# Patient Record
Sex: MALE | Race: ASIAN | Employment: FULL TIME | URBAN - METROPOLITAN AREA
[De-identification: names, ages, dates, MRNs, and addresses within clinical notes are randomized per-mention and may not be internally consistent; named-entity substitution may affect disease eponyms.]

---

## 2018-08-29 ENCOUNTER — ANESTHESIA EVENT (OUTPATIENT)
Dept: ENDOSCOPY | Age: 48
DRG: 378 | End: 2018-08-29
Payer: COMMERCIAL

## 2018-08-29 ENCOUNTER — HOSPITAL ENCOUNTER (INPATIENT)
Age: 48
LOS: 2 days | Discharge: HOME | DRG: 378 | End: 2018-08-31
Attending: EMERGENCY MEDICINE | Admitting: HOSPITALIST
Payer: COMMERCIAL

## 2018-08-29 ENCOUNTER — ANESTHESIA (OUTPATIENT)
Dept: ENDOSCOPY | Age: 48
DRG: 378 | End: 2018-08-29
Payer: COMMERCIAL

## 2018-08-29 ENCOUNTER — APPOINTMENT (OUTPATIENT)
Dept: GENERAL RADIOLOGY | Age: 48
DRG: 378 | End: 2018-08-29
Attending: EMERGENCY MEDICINE
Payer: COMMERCIAL

## 2018-08-29 DIAGNOSIS — K92.2 UPPER GI BLEED: ICD-10-CM

## 2018-08-29 DIAGNOSIS — D64.9 ANEMIA, UNSPECIFIED TYPE: Primary | ICD-10-CM

## 2018-08-29 LAB
ALBUMIN SERPL-MCNC: 3.6 G/DL (ref 3.5–5)
ALBUMIN/GLOB SERPL: 1.3 {RATIO} (ref 1.1–2.2)
ALP SERPL-CCNC: 54 U/L (ref 45–117)
ALT SERPL-CCNC: 43 U/L (ref 12–78)
ANION GAP SERPL CALC-SCNC: 6 MMOL/L (ref 5–15)
APTT PPP: 25.1 SEC (ref 22.1–32)
AST SERPL-CCNC: 30 U/L (ref 15–37)
BASOPHILS # BLD: 0 K/UL (ref 0–0.1)
BASOPHILS NFR BLD: 1 % (ref 0–1)
BILIRUB SERPL-MCNC: 0.3 MG/DL (ref 0.2–1)
BNP SERPL-MCNC: 8 PG/ML (ref 0–125)
BUN SERPL-MCNC: 22 MG/DL (ref 6–20)
BUN/CREAT SERPL: 21 (ref 12–20)
CALCIUM SERPL-MCNC: 7.5 MG/DL (ref 8.5–10.1)
CHLORIDE SERPL-SCNC: 105 MMOL/L (ref 97–108)
CO2 SERPL-SCNC: 28 MMOL/L (ref 21–32)
COMMENT, HOLDF: NORMAL
CREAT SERPL-MCNC: 1.05 MG/DL (ref 0.7–1.3)
DIFFERENTIAL METHOD BLD: ABNORMAL
EOSINOPHIL # BLD: 0 K/UL (ref 0–0.4)
EOSINOPHIL NFR BLD: 1 % (ref 0–7)
ERYTHROCYTE [DISTWIDTH] IN BLOOD BY AUTOMATED COUNT: 12.5 % (ref 11.5–14.5)
GLOBULIN SER CALC-MCNC: 2.7 G/DL (ref 2–4)
GLUCOSE SERPL-MCNC: 111 MG/DL (ref 65–100)
HCT VFR BLD AUTO: 21.8 % (ref 36.6–50.3)
HEMOCCULT STL QL: POSITIVE
HGB BLD-MCNC: 7.3 G/DL (ref 12.1–17)
IMM GRANULOCYTES # BLD: 0 K/UL (ref 0–0.04)
IMM GRANULOCYTES NFR BLD AUTO: 0 % (ref 0–0.5)
INR PPP: 1 (ref 0.9–1.1)
LYMPHOCYTES # BLD: 1.6 K/UL (ref 0.8–3.5)
LYMPHOCYTES NFR BLD: 30 % (ref 12–49)
MCH RBC QN AUTO: 30.2 PG (ref 26–34)
MCHC RBC AUTO-ENTMCNC: 33.5 G/DL (ref 30–36.5)
MCV RBC AUTO: 90.1 FL (ref 80–99)
MONOCYTES # BLD: 0.4 K/UL (ref 0–1)
MONOCYTES NFR BLD: 8 % (ref 5–13)
NEUTS SEG # BLD: 3.2 K/UL (ref 1.8–8)
NEUTS SEG NFR BLD: 61 % (ref 32–75)
NRBC # BLD: 0 K/UL (ref 0–0.01)
NRBC BLD-RTO: 0 PER 100 WBC
PLATELET # BLD AUTO: 283 K/UL (ref 150–400)
PMV BLD AUTO: 9.1 FL (ref 8.9–12.9)
POTASSIUM SERPL-SCNC: 3.6 MMOL/L (ref 3.5–5.1)
PROT SERPL-MCNC: 6.3 G/DL (ref 6.4–8.2)
PROTHROMBIN TIME: 10.5 SEC (ref 9–11.1)
RBC # BLD AUTO: 2.42 M/UL (ref 4.1–5.7)
SAMPLES BEING HELD,HOLD: NORMAL
SODIUM SERPL-SCNC: 139 MMOL/L (ref 136–145)
THERAPEUTIC RANGE,PTTT: NORMAL SECS (ref 58–77)
TROPONIN I SERPL-MCNC: <0.05 NG/ML
WBC # BLD AUTO: 5.3 K/UL (ref 4.1–11.1)

## 2018-08-29 PROCEDURE — 76060000031 HC ANESTHESIA FIRST 0.5 HR: Performed by: INTERNAL MEDICINE

## 2018-08-29 PROCEDURE — 30233N1 TRANSFUSION OF NONAUTOLOGOUS RED BLOOD CELLS INTO PERIPHERAL VEIN, PERCUTANEOUS APPROACH: ICD-10-PCS | Performed by: HOSPITALIST

## 2018-08-29 PROCEDURE — 93005 ELECTROCARDIOGRAM TRACING: CPT

## 2018-08-29 PROCEDURE — 76040000019: Performed by: INTERNAL MEDICINE

## 2018-08-29 PROCEDURE — 36415 COLL VENOUS BLD VENIPUNCTURE: CPT | Performed by: HOSPITALIST

## 2018-08-29 PROCEDURE — 77030010936 HC CLP LIG BSC -C: Performed by: INTERNAL MEDICINE

## 2018-08-29 PROCEDURE — C9113 INJ PANTOPRAZOLE SODIUM, VIA: HCPCS | Performed by: HOSPITALIST

## 2018-08-29 PROCEDURE — 86923 COMPATIBILITY TEST ELECTRIC: CPT | Performed by: EMERGENCY MEDICINE

## 2018-08-29 PROCEDURE — 36430 TRANSFUSION BLD/BLD COMPNT: CPT

## 2018-08-29 PROCEDURE — 74011250636 HC RX REV CODE- 250/636: Performed by: EMERGENCY MEDICINE

## 2018-08-29 PROCEDURE — 74011250636 HC RX REV CODE- 250/636: Performed by: HOSPITALIST

## 2018-08-29 PROCEDURE — 74011250637 HC RX REV CODE- 250/637: Performed by: INTERNAL MEDICINE

## 2018-08-29 PROCEDURE — 85025 COMPLETE CBC W/AUTO DIFF WBC: CPT | Performed by: EMERGENCY MEDICINE

## 2018-08-29 PROCEDURE — 65660000000 HC RM CCU STEPDOWN

## 2018-08-29 PROCEDURE — 99284 EMERGENCY DEPT VISIT MOD MDM: CPT

## 2018-08-29 PROCEDURE — 74011250636 HC RX REV CODE- 250/636

## 2018-08-29 PROCEDURE — P9016 RBC LEUKOCYTES REDUCED: HCPCS | Performed by: EMERGENCY MEDICINE

## 2018-08-29 PROCEDURE — 0W3P8ZZ CONTROL BLEEDING IN GASTROINTESTINAL TRACT, VIA NATURAL OR ARTIFICIAL OPENING ENDOSCOPIC: ICD-10-PCS | Performed by: INTERNAL MEDICINE

## 2018-08-29 PROCEDURE — 82272 OCCULT BLD FECES 1-3 TESTS: CPT | Performed by: EMERGENCY MEDICINE

## 2018-08-29 PROCEDURE — 86900 BLOOD TYPING SEROLOGIC ABO: CPT | Performed by: EMERGENCY MEDICINE

## 2018-08-29 PROCEDURE — 71045 X-RAY EXAM CHEST 1 VIEW: CPT

## 2018-08-29 PROCEDURE — 74011000250 HC RX REV CODE- 250: Performed by: EMERGENCY MEDICINE

## 2018-08-29 PROCEDURE — 85730 THROMBOPLASTIN TIME PARTIAL: CPT | Performed by: EMERGENCY MEDICINE

## 2018-08-29 PROCEDURE — 85610 PROTHROMBIN TIME: CPT | Performed by: EMERGENCY MEDICINE

## 2018-08-29 PROCEDURE — 80053 COMPREHEN METABOLIC PANEL: CPT | Performed by: EMERGENCY MEDICINE

## 2018-08-29 PROCEDURE — 74011250637 HC RX REV CODE- 250/637: Performed by: HOSPITALIST

## 2018-08-29 PROCEDURE — C9113 INJ PANTOPRAZOLE SODIUM, VIA: HCPCS | Performed by: EMERGENCY MEDICINE

## 2018-08-29 PROCEDURE — 84484 ASSAY OF TROPONIN QUANT: CPT | Performed by: EMERGENCY MEDICINE

## 2018-08-29 PROCEDURE — 74011000250 HC RX REV CODE- 250: Performed by: HOSPITALIST

## 2018-08-29 PROCEDURE — 83880 ASSAY OF NATRIURETIC PEPTIDE: CPT | Performed by: EMERGENCY MEDICINE

## 2018-08-29 PROCEDURE — 85018 HEMOGLOBIN: CPT | Performed by: HOSPITALIST

## 2018-08-29 DEVICE — WORKING LENGTH 235CM, WORKING CHANNEL 2.8MM
Type: IMPLANTABLE DEVICE | Site: STOMACH | Status: FUNCTIONAL
Brand: RESOLUTION 360 CLIP

## 2018-08-29 RX ORDER — FENTANYL CITRATE 50 UG/ML
100 INJECTION, SOLUTION INTRAMUSCULAR; INTRAVENOUS
Status: DISCONTINUED | OUTPATIENT
Start: 2018-08-29 | End: 2018-08-29 | Stop reason: HOSPADM

## 2018-08-29 RX ORDER — CLOPIDOGREL BISULFATE 75 MG/1
75 TABLET ORAL DAILY
COMMUNITY

## 2018-08-29 RX ORDER — MIDAZOLAM HYDROCHLORIDE 1 MG/ML
.25-1 INJECTION, SOLUTION INTRAMUSCULAR; INTRAVENOUS
Status: DISCONTINUED | OUTPATIENT
Start: 2018-08-29 | End: 2018-08-29 | Stop reason: HOSPADM

## 2018-08-29 RX ORDER — NALOXONE HYDROCHLORIDE 0.4 MG/ML
0.4 INJECTION, SOLUTION INTRAMUSCULAR; INTRAVENOUS; SUBCUTANEOUS
Status: DISCONTINUED | OUTPATIENT
Start: 2018-08-29 | End: 2018-08-29 | Stop reason: HOSPADM

## 2018-08-29 RX ORDER — ATORVASTATIN CALCIUM 40 MG/1
40 TABLET, FILM COATED ORAL EVERY EVENING
COMMUNITY

## 2018-08-29 RX ORDER — ACETAMINOPHEN 325 MG/1
650 TABLET ORAL
Status: DISCONTINUED | OUTPATIENT
Start: 2018-08-29 | End: 2018-08-31 | Stop reason: HOSPADM

## 2018-08-29 RX ORDER — LOSARTAN POTASSIUM 25 MG/1
12.5 TABLET ORAL
COMMUNITY

## 2018-08-29 RX ORDER — SODIUM CHLORIDE 0.9 % (FLUSH) 0.9 %
5-10 SYRINGE (ML) INJECTION EVERY 8 HOURS
Status: DISCONTINUED | OUTPATIENT
Start: 2018-08-29 | End: 2018-08-31 | Stop reason: HOSPADM

## 2018-08-29 RX ORDER — ATORVASTATIN CALCIUM 10 MG/1
10 TABLET, FILM COATED ORAL EVERY EVENING
COMMUNITY
End: 2018-08-29

## 2018-08-29 RX ORDER — DEXTROMETHORPHAN/PSEUDOEPHED 2.5-7.5/.8
1.2 DROPS ORAL
Status: DISCONTINUED | OUTPATIENT
Start: 2018-08-29 | End: 2018-08-29 | Stop reason: HOSPADM

## 2018-08-29 RX ORDER — SODIUM CHLORIDE 9 MG/ML
50 INJECTION, SOLUTION INTRAVENOUS CONTINUOUS
Status: DISPENSED | OUTPATIENT
Start: 2018-08-29 | End: 2018-08-29

## 2018-08-29 RX ORDER — SODIUM CHLORIDE 0.9 % (FLUSH) 0.9 %
5-10 SYRINGE (ML) INJECTION EVERY 8 HOURS
Status: COMPLETED | OUTPATIENT
Start: 2018-08-29 | End: 2018-08-29

## 2018-08-29 RX ORDER — FLUMAZENIL 0.1 MG/ML
0.2 INJECTION INTRAVENOUS
Status: DISCONTINUED | OUTPATIENT
Start: 2018-08-29 | End: 2018-08-29 | Stop reason: HOSPADM

## 2018-08-29 RX ORDER — EPINEPHRINE 0.1 MG/ML
1 INJECTION INTRACARDIAC; INTRAVENOUS
Status: DISCONTINUED | OUTPATIENT
Start: 2018-08-29 | End: 2018-08-29 | Stop reason: HOSPADM

## 2018-08-29 RX ORDER — PROPOFOL 10 MG/ML
INJECTION, EMULSION INTRAVENOUS AS NEEDED
Status: DISCONTINUED | OUTPATIENT
Start: 2018-08-29 | End: 2018-08-29 | Stop reason: HOSPADM

## 2018-08-29 RX ORDER — ATROPINE SULFATE 0.1 MG/ML
0.5 INJECTION INTRAVENOUS
Status: DISCONTINUED | OUTPATIENT
Start: 2018-08-29 | End: 2018-08-29 | Stop reason: HOSPADM

## 2018-08-29 RX ORDER — ONDANSETRON 2 MG/ML
4 INJECTION INTRAMUSCULAR; INTRAVENOUS
Status: DISCONTINUED | OUTPATIENT
Start: 2018-08-29 | End: 2018-08-31 | Stop reason: HOSPADM

## 2018-08-29 RX ORDER — SODIUM CHLORIDE 9 MG/ML
250 INJECTION, SOLUTION INTRAVENOUS AS NEEDED
Status: DISCONTINUED | OUTPATIENT
Start: 2018-08-29 | End: 2018-08-31 | Stop reason: HOSPADM

## 2018-08-29 RX ORDER — ASPIRIN 81 MG/1
81 TABLET ORAL DAILY
COMMUNITY
End: 2019-05-01

## 2018-08-29 RX ORDER — SODIUM CHLORIDE 0.9 % (FLUSH) 0.9 %
5-10 SYRINGE (ML) INJECTION AS NEEDED
Status: DISCONTINUED | OUTPATIENT
Start: 2018-08-29 | End: 2018-08-31 | Stop reason: HOSPADM

## 2018-08-29 RX ORDER — SODIUM CHLORIDE 9 MG/ML
INJECTION, SOLUTION INTRAVENOUS
Status: DISCONTINUED | OUTPATIENT
Start: 2018-08-29 | End: 2018-08-29 | Stop reason: HOSPADM

## 2018-08-29 RX ORDER — SODIUM CHLORIDE 0.9 % (FLUSH) 0.9 %
5-10 SYRINGE (ML) INJECTION AS NEEDED
Status: ACTIVE | OUTPATIENT
Start: 2018-08-29 | End: 2018-08-29

## 2018-08-29 RX ORDER — ATORVASTATIN CALCIUM 40 MG/1
40 TABLET, FILM COATED ORAL EVERY EVENING
Status: DISCONTINUED | OUTPATIENT
Start: 2018-08-29 | End: 2018-08-31 | Stop reason: HOSPADM

## 2018-08-29 RX ORDER — SODIUM CHLORIDE 9 MG/ML
75 INJECTION, SOLUTION INTRAVENOUS CONTINUOUS
Status: DISCONTINUED | OUTPATIENT
Start: 2018-08-29 | End: 2018-08-30

## 2018-08-29 RX ADMIN — SODIUM CHLORIDE 75 ML/HR: 900 INJECTION, SOLUTION INTRAVENOUS at 21:51

## 2018-08-29 RX ADMIN — SODIUM CHLORIDE: 9 INJECTION, SOLUTION INTRAVENOUS at 15:15

## 2018-08-29 RX ADMIN — SIMETHICONE 80 MG: 20 SUSPENSION/ DROPS ORAL at 15:50

## 2018-08-29 RX ADMIN — Medication 10 ML: at 23:05

## 2018-08-29 RX ADMIN — Medication 10 ML: at 17:46

## 2018-08-29 RX ADMIN — PROPOFOL 50 MG: 10 INJECTION, EMULSION INTRAVENOUS at 15:49

## 2018-08-29 RX ADMIN — PROPOFOL 50 MG: 10 INJECTION, EMULSION INTRAVENOUS at 15:53

## 2018-08-29 RX ADMIN — PROPOFOL 100 MG: 10 INJECTION, EMULSION INTRAVENOUS at 15:46

## 2018-08-29 RX ADMIN — SODIUM CHLORIDE 40 MG: 9 INJECTION, SOLUTION INTRAMUSCULAR; INTRAVENOUS; SUBCUTANEOUS at 23:05

## 2018-08-29 RX ADMIN — PROPOFOL 50 MG: 10 INJECTION, EMULSION INTRAVENOUS at 15:51

## 2018-08-29 RX ADMIN — Medication 10 ML: at 13:45

## 2018-08-29 RX ADMIN — SODIUM CHLORIDE 1000 ML: 900 INJECTION, SOLUTION INTRAVENOUS at 10:28

## 2018-08-29 RX ADMIN — SODIUM CHLORIDE 80 MG: 9 INJECTION, SOLUTION INTRAMUSCULAR; INTRAVENOUS; SUBCUTANEOUS at 10:28

## 2018-08-29 RX ADMIN — SODIUM CHLORIDE 250 ML: 900 INJECTION, SOLUTION INTRAVENOUS at 17:46

## 2018-08-29 RX ADMIN — ATORVASTATIN CALCIUM 40 MG: 40 TABLET, FILM COATED ORAL at 18:40

## 2018-08-29 RX ADMIN — PROPOFOL 50 MG: 10 INJECTION, EMULSION INTRAVENOUS at 15:47

## 2018-08-29 RX ADMIN — Medication 10 ML: at 21:51

## 2018-08-29 NOTE — ED NOTES
Pt resting quietly, no changes to assessment, denies pain. Using cell phone. IV site intact and patent with IVF infusing.

## 2018-08-29 NOTE — PROGRESS NOTES
118 [facility]. 
217 [facility] [address] [place], [address] 
[phone] History and Physical  
 
NAME: Emily Gibson :  1970 MRN:  [id number] HPI:  The patient was seen and examined. Past Surgical History:  
Procedure Laterality Date  [facility] Past Medical History:  
Diagnosis Date  CAD (coronary artery disease)  Gastrointestinal disorder \"ulcer\"  H/O small bowel obstruction  Pyloric stenosis Social History Substance Use Topics  Smoking status: Never Smoker  Smokeless tobacco: Never Used  Alcohol use No  
   Comment: social  
 
Allergies Allergen Reactions  Morphine Rash Family History Problem Relation Age of Onset  Cancer Mother   
  [facility] cancer survivor  Diabetes Father Current Facility-Administered Medications Medication Dose Route Frequency  0.9% sodium chloride infusion 250 mL  250 mL IntraVENous PRN  
 atorvastatin (LIPITOR) tablet 40 mg  40 mg Oral QPM  
 sodium chloride (NS) flush 5-10 mL  5-10 mL IntraVENous Q8H  
 sodium chloride (NS) flush 5-10 mL  5-10 mL IntraVENous PRN  
 acetaminophen (TYLENOL) tablet 650 mg  650 mg Oral Q4H PRN  
 ondansetron (ZOFRAN) injection 4 mg  4 mg IntraVENous Q4H PRN  pantoprazole (PROTONIX) 40 mg in sodium chloride 0.9% 10 mL injection  40 mg IntraVENous Q12H  
 0.9% sodium chloride infusion  75 mL/hr IntraVENous CONTINUOUS Facility-Administered Medications Ordered in Other Encounters Medication Dose Route Frequency  0.9% sodium chloride infusion   IntraVENous CONTINUOUS  
 
 
 
PHYSICAL EXAM: 
General: WD, WN. Alert, cooperative, no acute distress   
HEENT: NC, Atraumatic. PERRLA, EOMI. Anicteric sclerae. Lungs:  CTA Bilaterally. No Wheezing/Rhonchi/Rales. Heart:  Regular  rhythm,  No murmur, No Rubs, No Gallops Abdomen: Soft, Non distended, Non tender.  +Bowel sounds, no HSM Extremities: No c/c/e Neurologic:  CN 2-12 gi, Alert and oriented X 3. No acute neurological distress Psych:   Good insight. Not anxious nor agitated. The heart, lungs and mental status were satisfactory for the administration of MAC sedation and for the procedure. Mallampati score: 2 Assessment: · Anemia/melena Plan:  
· Endoscopic procedure : EGD · MAC sedation

## 2018-08-29 NOTE — PROGRESS NOTES
Alpa Krueger 
1970 
[id number] Situation: 
Verbal report received from: [facility] AT [facility] Rn 
Procedure: Procedure(s): ESOPHAGOGASTRODUODENOSCOPY (EGD) RESOLUTION CLIP Background: 
 
Preoperative diagnosis: GI Bleed Postoperative diagnosis: 1.- Irregular Z- Line 2.- Anastamotic Ulcer with Oozing :  Dr. Bam Cunningham Assistant(s): Endoscopy Technician-1: Yarely Da Silva Endoscopy RN-1: Dalila Covington RN Specimens: * No specimens in log * H. Pylori  no Assessment: 
Intra-procedure medications Anesthesia gave intra-procedure sedation and medications, see anesthesia flow sheet yes Intravenous fluids: NS@ Lambert Caper Vital signs stable  yes Abdominal assessment: round and soft  yes Recommendation: 
Discharge patient per MD order yes. Return to floor yes Family in waiting room Permission to share finding with family or friend yes

## 2018-08-29 NOTE — PROGRESS NOTES
TRANSFER - OUT REPORT: 
 
Verbal report given to daisy Cassidy(name) on Patience Florenceg  being transferred to [facility](unit) for routine post - op Report consisted of patients Situation, Background, Assessment and  
Recommendations(SBAR). Information from the following report(s) SBAR, Accordion and Cardiac Rhythm SR was reviewed with the receiving nurse. Lines:  
Peripheral IV 08/29/18 Right Antecubital (Active) Site Assessment Clean, dry, & intact 8/29/2018  2:52 PM  
Phlebitis Assessment 0 8/29/2018  2:52 PM  
Infiltration Assessment 0 8/29/2018  2:52 PM  
Dressing Status Clean, dry, & intact 8/29/2018  2:52 PM  
Dressing Type Transparent 8/29/2018  2:52 PM  
Hub Color/Line Status Infusing 8/29/2018  2:52 PM  
Action Taken Blood drawn 8/29/2018  9:36 AM  
   
Peripheral IV 08/29/18 Left Arm (Active) Site Assessment Clean, dry, & intact 8/29/2018  3:35 PM  
Phlebitis Assessment 0 8/29/2018  3:35 PM  
Infiltration Assessment 0 8/29/2018  3:35 PM  
Dressing Status Clean, dry, & intact 8/29/2018  3:35 PM  
Dressing Type Tape;Transparent 8/29/2018  3:35 PM  
Hub Color/Line Status Blue 8/29/2018  3:35 PM  
  
 
Opportunity for questions and clarification was provided. Patient transported by transporter.

## 2018-08-29 NOTE — PROGRESS NOTES
Consult called on pt with recent drug eluting stent at [facility] 7/2018 and admit with GI bleed. Primary team contacted cardiology at [facility] who recommended ASA and plavix for 6 months and I agree. Discussed pt with Dr. Vinod Sullivan and reviewed chart. Full consult to follow tomorrow AM.

## 2018-08-29 NOTE — PROGRESS NOTES

## 2018-08-29 NOTE — PROGRESS NOTES
Alix Severin 
1970 
[id number] Situation: 
Verbal report received from: [employer] Preoperative diagnosis: GI Bleed Patient stated . Background: 
Procedure: Procedure(s): ESOPHAGOGASTRODUODENOSCOPY (EGD) Physician performing procedure; Dr. Svetlana Oshea Patient diabetic no Patient taking blood thinners yes Patient has a defibrillator: no  
Patient needs antibiotics: no  
 
Assessment: 
Vital signs stable yes Alert and oriented yes Abdominal assessment: round and soft Recommendation: 
Endoscopic Procedure Family or Friend yes Permission to share finding with Family or Friend yes

## 2018-08-29 NOTE — IP AVS SNAPSHOT
[address] [facility] [place] [address] 
[phone] Patient: Luz Poe MRN: [id number] OJR:[id number] About your hospitalization You were admitted on:  August 29, 2018 You last received care in the:  [place] [id number] You were discharged on:  August 31, 2018 Why you were hospitalized Your primary diagnosis was:  Gi Bleed Follow-up Information Follow up With Details Comments Contact Info Marixa Rojo NP Schedule an appointment as soon as possible for a visit on 9/6/2018 [facility] PCP f/u appointment on Thursday 9/6 @ 3:00 p.m. [address] [facility] [place] [facility] 
[phone] Jose Browning MD Schedule an appointment as soon as possible for a visit in 12 weeks for repeat endoscopy [address] [facility] [address] [facility] [place] [facility] 
[phone] Tata Machado MD Schedule an appointment as soon as possible for a visit  819 [facility] 57 
[phone] Discharge Orders None A check toshia indicates which time of day the medication should be taken. My Medications START taking these medications Instructions Each Dose to Equal  
 Morning Noon Evening Bedtime  
 ferrous sulfate 325 mg (65 mg iron) tablet Your last dose was: Your next dose is: Take 1 Tab by mouth two (2) times daily (with meals). 325 mg  
    
   
   
   
  
 pantoprazole 40 mg tablet Commonly known as:  PROTONIX Your last dose was: Your next dose is: Take 1 Tab by mouth two (2) times a day for 30 days. 40 mg CONTINUE taking these medications Instructions Each Dose to Equal  
 Morning Noon Evening Bedtime  
 aspirin delayed-release 81 mg tablet Your last dose was: Your next dose is: Take 81 mg by mouth daily.   
 81 mg  
    
   
   
   
  
 atorvastatin 40 mg tablet Commonly known as:  LIPITOR Your last dose was: Your next dose is: Take 40 mg by mouth every evening. 40 mg  
    
   
   
   
  
 clopidogrel 75 mg Tab Commonly known as:  PLAVIX Your last dose was: Your next dose is: Take 75 mg by mouth daily. 75 mg FISH OIL PO Your last dose was: Your next dose is: Take 1 Cap by mouth daily. 1 Cap  
    
   
   
   
  
 losartan 25 mg tablet Commonly known as:  COZAAR Your last dose was: Your next dose is: Take 12.5 mg by mouth daily. 12.5 mg  
    
   
   
   
  
 VITAMIN C PO Your last dose was: Your next dose is: Take  by mouth. VITAMIN D3 PO Your last dose was: Your next dose is: Take  by mouth. Where to Get Your Medications Information on where to get these meds will be given to you by the nurse or doctor. ! Ask your nurse or doctor about these medications  
  ferrous sulfate 325 mg (65 mg iron) tablet  
 pantoprazole 40 mg tablet Discharge Instructions Discharge Instructions PATIENT ID: Alpa Krueger MRN: [id number] YOB: 1970 DATE OF ADMISSION: 8/29/2018  9:15 AM   
DATE OF DISCHARGE: 8/31/2018 PRIMARY CARE PROVIDER: Barry Ervin MD  
 
ATTENDING PHYSICIAN: Brent Hui MD 
DISCHARGING PROVIDER: Brent Hui MD   
To contact this individual call [phone] and ask the  to page. If unavailable ask to be transferred the Adult Hospitalist Department. DISCHARGE DIAGNOSES Gastrointestinal bleed, stomach ulcer, anemia CONSULTATIONS: IP CONSULT TO GASTROENTEROLOGY 
IP CONSULT TO GASTROENTEROLOGY 
IP CONSULT TO CARDIOLOGY PROCEDURES/SURGERIES: Procedure(s): ESOPHAGOGASTRODUODENOSCOPY (EGD) RESOLUTION CLIP 
 PENDING TEST RESULTS:  
At the time of discharge the following test results are still pending: H. Pylori antibody panel FOLLOW UP APPOINTMENTS:  
Follow-up Information Follow up With Details Comments Contact Info Coty Lindsay MD Schedule an appointment as soon as possible for a visit in 1 week for hospital discharge follow-up [address] [facility] [address] [place] [address] 
[phone] Herbert Jimenes MD Schedule an appointment as soon as possible for a visit in 12 weeks for repeat endoscopy [address] [facility] 202 [address] [place] [address] 
[phone] Saul Ennis MD Schedule an appointment as soon as possible for a visit  [address] [place] [address] 
[phone] ADDITIONAL CARE RECOMMENDATIONS:  
 
You were found to have a bleeding stomach ulcer and are being prescribed an acid suppressing medication to help it heal; will need to follow-up with your primary doctor or gastroenterologist to have this refilled (you're being given a month's supply but will need to be on it for at least 3 months). You will need a repeat endoscopy in 3 months to ensure the ulcer is adequately healed. You need to keep taking the aspirin and Plavix so your heart stent doesn't clot; follow-up with your cardiologist. 
 
Danella Meter are being prescribed iron supplements to help with the anemia; however, a regular, balanced diet would be adequate. The iron tablets can be stopped if they cause stomach upset. DIET: Cardiac Diet ACTIVITY: Activity as tolerated DISCHARGE MEDICATIONS: 
 See Medication Reconciliation Form · It is important that you take the medication exactly as they are prescribed. · Keep your medication in the bottles provided by the pharmacist and keep a list of the medication names, dosages, and times to be taken in your wallet. · Do not take other medications without consulting your doctor. NOTIFY YOUR PHYSICIAN FOR ANY OF THE FOLLOWING:  
Fever over 101 degrees for 24 hours. Chest pain, shortness of breath, fever, chills, nausea, vomiting, diarrhea, change in mentation, falling, weakness, bleeding. Severe pain or pain not relieved by medications. Or, any other signs or symptoms that you may have questions about. DISPOSITION: 
 x Home With: 
 OT  PT  [facility]  RN  
  
 SNF/Inpatient Rehab/LTAC Independent/assisted living Hospice Other: CDMP Checked:  
Yes x PROBLEM LIST Updated: 
Yes x Signed:  
Mali Herrera MD 
8/31/2018 
7:40 AM 
 
 
  
  
  
[employer] Announcement We are excited to announce that we are making your provider's discharge notes available to you in [employer]. You will see these notes when they are completed and signed by the physician that discharged you from your recent hospital stay. If you have any questions or concerns about any information you see in [employer], please call the Health Information Department where you were seen or reach out to your Primary Care Provider for more information about your plan of care. Introducing [facility]! [employer] introduces [employer] patient portal. Now you can access parts of your medical record, email your doctor's office, and request medication refills online. 1. In your internet browser, go to [email or web address] [employer] 2. Click on the First Time User? Click Here link in the Sign In box. You will see the New Member Sign Up page. 3. Enter your [employer] Access Code exactly as it appears below. You will not need to use this code after youve completed the sign-up process. If you do not sign up before the expiration date, you must request a new code. · [employer] Access Code: [email or web address] Expires: 11/27/2018  9:09 AM 
 
4. Enter the last four digits of your Social Security Number (xxxx) and Date of Birth (mm/dd/yyyy) as indicated and click Submit.  You will be taken to the next sign-up page. 5. Create a staila techn[employer]iest ID. This will be your [employer] login ID and cannot be changed, so think of one that is secure and easy to remember. 6. Create a [employer] password. You can change your password at any time. 7. Enter your Password Reset Question and Answer. This can be used at a later time if you forget your password. 8. Enter your e-mail address. You will receive e-mail notification when new information is available in [address] 9. Click Sign Up. You can now view and download portions of your medical record. 10. Click the Download Summary menu link to download a portable copy of your medical information. If you have questions, please visit the Frequently Asked Questions section of the [employer] website. Remember, [employer] is NOT to be used for urgent needs. For medical emergencies, dial 911. Now available from your iPhone and Android! Introducing Jose Shaw As a [facility] patient, I wanted to make you aware of our electronic visit tool called Jose Shaw. [facility] 24/7 allows you to connect within minutes with a medical provider 24 hours a day, seven days a week via a mobile device or tablet or logging into a secure website from your computer. You can access Jose Chongfin from anywhere in the [place]. A virtual visit might be right for you when you have a simple condition and feel like you just dont want to get out of bed, or cant get away from work for an appointment, when your regular [facility] provider is not available (evenings, weekends or holidays), or when youre out of town and need minor care. Electronic visits cost only $49 and if the [facility] 24/7 provider determines a prescription is needed to treat your condition, one can be electronically transmitted to a nearby pharmacy*. Please take a moment to enroll today if you have not already done so.   The enrollment process is free and takes just a few minutes. To enroll, please download the [employer] 24/7 ruddy to your tablet or phone, or visit [email or web address] to enroll on your computer. And, as an [address] [place] [address] patient with a [employer] account, the results of your visits will be scanned into your electronic medical record and your primary care provider will be able to view the scanned results. We urge you to continue to see your regular [employer] provider for your ongoing medical care. And while your primary care provider may not be the one available when you seek a "[employer] virtual visit, the peace of mind you get from getting a real diagnosis real time can be priceless. For more information on "[employer] view our Frequently Asked Questions (FAQs) at [email or web address]. Sincerely, 
 
Emily Garcia MD 
Chief Medical Officer Jony Gunn Herrera *:  certain medications cannot be prescribed via "[employer] Unresulted Labs-Please follow up with your PCP about these lab tests Order Current Status H. PYLORI ABS, IGG, IGA, IGM In process Providers Seen During Your Hospitalization Provider Specialty Primary office phone Kentrell Hamilton MD Emergency Medicine [phone] Brent Hui MD Hospitalist [phone] Your Primary Care Physician (PCP) Primary Care Physician Office Phone Office Fax Felice Carr [phone] You are allergic to the following Allergen Reactions Morphine Rash Recent Documentation Height Weight BMI Smoking Status 1.702 m 90.7 kg 31.32 kg/m2 Never Smoker Emergency Contacts Name Discharge Info Relation Home Work Mobile Taylor Dunn DISCHARGE CAREGIVER [3] Spouse [3] [phone] Patient Belongings The following personal items are in your possession at time of discharge: Dental Appliances: None  Visual Aid: Glasses, With patient Please provide this summary of care documentation to your next provider. Signatures-by signing, you are acknowledging that this After Visit Summary has been reviewed with you and you have received a copy. Patient Signature:  ____________________________________________________________ Date:  ____________________________________________________________  
  
Margo Omaha Provider Signature:  ____________________________________________________________ Date:  ____________________________________________________________

## 2018-08-29 NOTE — PROGRESS NOTES
[address] [facility] cardiology, the pt's cardiologist Dr. Aster Liang was out of hospital not on page. I spoke to Dr. Shiv Dumont (on-call for her group) who reviewed the patient's chart and stated the patient had a drug-eluting stents to the mid-LAD and needs to be on dual platelet therapy (ASA and Plavix) for at least 6 months, preferably a year. He also recommended consulting cardiology here. His cell phone number is [phone].

## 2018-08-29 NOTE — PROCEDURES
118 [facility].  217 [facility] 140 Kristofer Garcia, [address]  [phone]                            NAME:  Luz Poe   :   1970   MRN:   [id number]     Date/Time:  2018 4:03 PM    Esophagogastroduodenoscopy (EGD) Procedure Note    :  Jose Browning MD    Referring Provider:  Palak Khanna MD    Anethesia/Sedation:  MAC anesthesia    Procedure Details   After infomed consent was obtained for the procedure, with all risks and benefits of procedure explained the patient was taken to the endoscopy suite and placed in the left lateral decubitus position. Following sequential administration of sedation as per above, the [id number] gastroscope was inserted into the mouth and advanced under direct vision to second portion of the duodenum. A careful inspection was made as the gastroscope was withdrawn, including a retroflexed view of the proximal stomach; findings and interventions are described below. Findings:  Esophagus:irregular Z line at 40 cm from the incisors  Stomach:evidence of prior surgical pyloroplasty in the antrum. 1.5 cm linear ulcer with active oozing along the lateral anastomosis. 3 clips placed with cessation of oozing  Duodenum/jejunum:normal bulb/D2      Therapies:  endoclip was placed for hemorrhage    Specimens: None           EBL: Minimal    Complications:   None; patient tolerated the procedure well.            Impression:    See Postoperative diagnosis above    Recommendations:  - Continue BID PPI  - Monitor for additional bleeding  - Repeat EGD 12 weeks to assess for healing    Jose Browning MD

## 2018-08-29 NOTE — ANESTHESIA POSTPROCEDURE EVALUATION
Post-Anesthesia Evaluation and Assessment Patient: Medina Taylor MRN: [id number]  SSN: [id number] YOB: 1970  Age: 49 y.o. Sex: male Cardiovascular Function/Vital Signs Visit Vitals  /81  Pulse 79  Temp 36.6 °C (97.9 °F)  Resp 16  
 Ht 5' 7\" (1.702 m)  Wt 90.7 kg (200 lb)  SpO2 100%  BMI 31.32 kg/m2 Patient is status post MAC anesthesia for Procedure(s): ESOPHAGOGASTRODUODENOSCOPY (EGD) RESOLUTION CLIP. Nausea/Vomiting: None Postoperative hydration reviewed and adequate. Pain: 
Pain Scale 1: Numeric (0 - 10) (08/29/18 1638) Pain Intensity 1: 0 (08/29/18 1638) Managed Neurological Status: At baseline Mental Status and Level of Consciousness: Arousable Pulmonary Status:  
O2 Device: Room air (08/29/18 1638) Adequate oxygenation and airway patent Complications related to anesthesia: None Post-anesthesia assessment completed. No concerns Signed By: Jacqueline Bradford MD   
 August 29, 2018

## 2018-08-29 NOTE — PROGRESS NOTES
Admission Medication Reconciliation: 
 
Information obtained from:  Patient Comments/Recommendations: All medications/allergies have been reviewed and updated; last medication administration times reviewed and recorded. MECHE was able to confirm current medications and report how he was taking them. He states that he has not taken ASA the past 2 mornings because he was not feeling well. MECHE reports taking fish oil, vitamin C, and vitamin D but does not know the strengths of these supplements. Changes made to Prior to Admission (PTA) Medication List: ? Medications Added: - ASA 81 mg 1 tab every day - Atorvastatin 40 mg 1 tab every day - Clopidogrel 75 mg 1 tab every day - Losartan 25 mg 1/2 tab every day - Fish oil every day - Vitamin C every day - Vitamin D every day ? Medications Changed:  
- None ? Medications Removed:  
- None Significant PMH/Disease States:  
Past Medical History:  
Diagnosis Date  CAD (coronary artery disease)  Gastrointestinal disorder \"ulcer\"  H/O small bowel obstruction  Pyloric stenosis Chief Complaint for this Admission: Chief Complaint Patient presents with  Fatigue  Shortness of Breath  Melena Allergies:  Morphine Prior to Admission Medications:  
Prior to Admission Medications Prescriptions Last Dose Informant Patient Reported? Taking?  
ascorbate calcium (VITAMIN C PO) 8/28/2018 at Unknown time  Yes Yes Sig: Take  by mouth. aspirin delayed-release 81 mg tablet 8/27/2018 at Morning  Yes Yes Sig: Take 81 mg by mouth daily. atorvastatin (LIPITOR) 40 mg tablet 8/28/2018 at Night  Yes Yes Sig: Take 40 mg by mouth every evening. cholecalciferol, vitamin D3, (VITAMIN D3 PO) 8/28/2018 at Unknown time  Yes Yes Sig: Take  by mouth. clopidogrel (PLAVIX) 75 mg tab 8/29/2018 at Morning  Yes Yes Sig: Take 75 mg by mouth daily. docosahexanoic acid/epa (FISH OIL PO) 8/28/2018 at Unknown time  Yes Yes Sig: Take 1 Cap by mouth daily. losartan (COZAAR) 25 mg tablet 8/28/2018 at Night  Yes Yes Sig: Take 12.5 mg by mouth daily. Facility-Administered Medications: None Thank you for allowing pharmacy to participate in the coordination of this patient's care. If you have any other questions, please contact the medication reconciliation pharmacist at x [phone]. Sign-off:Miguel Ángel Acosta, PharmD Candidate [id number]

## 2018-08-29 NOTE — ED TRIAGE NOTES
C/o dark tarry stools, heart palpitations, PINTO, and fatigue x 4-5 days. On plavix.   Dr. Swain Courser (cardiologist) recommended ER bjal

## 2018-08-29 NOTE — PROGRESS NOTES
Reason for Admission:   Anemia, unspecified type Patient seen in the ED and to be admitted. Patient alert and oriented. CM verified demographics, insurance coverage, and PCP. Patient resides with spouseAramis [phone]/ [phone] in there own home. Patient is independent with all ADL's and IADL's and does not utilize DME. Patient is currently employed with the [employer] and reports no significant financial stressors or concerns at this time. Pharmacy utilized for prescriptions is [facility] located off of [address]. Mode of transport at time of discharge to be provided by patient's family. RRAT Score:       TBD Plan for utilizing home health:      None needed at this time. TBD/subject to change pending recommendations. Likelihood of Readmission:  Low Transition of Care Plan:          Anticipated plan is for patient to discharge home with family assistance. There are no current CM consults or needs at this time. Disposition needs TBD/subject to change pending recommendations. CM will continue to follow and assist with disposition needs as they arise. Care Management Interventions PCP Verified by CM: Yes (Last saw PCP in January ) Palliative Care Criteria Met (RRAT>21 & CHF Dx)?: No 
Mode of Transport at Discharge: BLS Transition of Care Consult (CM Consult):  (There are no CM consults or needs at this time) Physical Therapy Consult: No 
Occupational Therapy Consult: No 
Speech Therapy Consult: No 
Current Support Network: Lives with Spouse, Own Home Confirm Follow Up Transport: Family Plan discussed with Pt/Family/Caregiver: Yes Freedom of Choice Offered: Yes Discharge Location Discharge Placement: Home with family assistance (TBD/subject to change pending recommendations) NAUP Fuentes/CASEY 
11:26 AM

## 2018-08-29 NOTE — PROGRESS NOTES
TRANSFER - OUT REPORT: 
 
Verbal report given to tanisha(name) on Allan Vazquez  being transferred to [facility](unit) for routine progression of care Report consisted of patients Situation, Background, Assessment and  
Recommendations(SBAR). Information from the following report(s) SBAR, Kardex, ED Summary, Intake/Output, MAR, Recent Results and Cardiac Rhythm NSR was reviewed with the receiving nurse. Lines:  
Peripheral IV 08/29/18 Right Antecubital (Active) Site Assessment Clean, dry, & intact 8/29/2018  9:36 AM  
Phlebitis Assessment 0 8/29/2018  9:36 AM  
Infiltration Assessment 0 8/29/2018  9:36 AM  
Dressing Status Clean, dry, & intact 8/29/2018  9:36 AM  
Dressing Type Transparent 8/29/2018  9:36 AM  
Hub Color/Line Status Pink;Flushed;Patent 8/29/2018  9:36 AM  
Action Taken Blood drawn 8/29/2018  9:36 AM  
  
 
Opportunity for questions and clarification was provided. Patient transported with: 
 [employer]

## 2018-08-29 NOTE — ANESTHESIA PREPROCEDURE EVALUATION
Anesthetic History No history of anesthetic complications Review of Systems / Medical History Patient summary reviewed, nursing notes reviewed and pertinent labs reviewed Pulmonary Within defined limits Neuro/Psych Within defined limits Cardiovascular CAD and cardiac stents GI/Hepatic/Renal 
  
 
 
 
 
 
Comments: GI bleed Endo/Other Anemia Other Findings Physical Exam 
 
Airway Mallampati: II 
TM Distance: > 6 cm Neck ROM: normal range of motion Mouth opening: Normal 
 
 Cardiovascular Regular rate and rhythm,  S1 and S2 normal,  no murmur, click, rub, or gallop Dental 
No notable dental hx Pulmonary Breath sounds clear to auscultation Abdominal 
GI exam deferred Other Findings Anesthetic Plan ASA: 3, emergent Anesthesia type: MAC Induction: Intravenous Anesthetic plan and risks discussed with: Patient

## 2018-08-29 NOTE — PROGRESS NOTES
Problem: Discharge Planning Goal: *Discharge to safe environment Outcome: Progressing Towards Goal 
Disposition Needs:  Anticipated plan is for patient to discharge home with family assistance. There are no current CM consults or needs at this time. Disposition needs TBD/subject to change pending recommendations. CM will continue to follow and assist with disposition needs as they arise. Mode of transport to be provided by patient's family at discharge.  
 
ANUP Avila/CASEY 
11:28 AM

## 2018-08-29 NOTE — CONSULTS
[address] [facility]  [address] [address] [address], [address]  [phone]                     GI CONSULTATION NOTE  Manjinder Varner, [facility]  Work Cell: [phone]      NAME:  Yuridia Goodson   :   1970   MRN:   [id number]       Referring Provider: Dr. Raymundo Number Date: 2018     Chief Complaint: Fatigue    History of Present Illness:  Patient is a 49 y.o. who is seen in consultation at the request of Dr. Gisselle Lau for upper GI bleed. Mr. Tracey Lomeli has a PMH as detailed below. He presented to the hospital with malaise and dizziness. Onset of symptoms was 4-5 days ago. He runs daily but felt like getting out of the bed was a chore. He reports associated melena. Denies any fever, chills, nausea, vomiting or BRBPR. Reports occasional history of heartburn for which he takes Tums PRN. Work-up in ER revealing Hgb of 7.3. Rectal exam with dark heme + stool. He reports distant history of small ulcer when he was in college. This was treated symptomatically. No prior EGD. Denies any tobacco or alcohol use. Had colonoscopy 8 years ago which was reportedly normal. This was completed due to history of pyloric stenosis as child s/p surgical intervention. He has history of CAD s/p stent placement 2018 at [facility]. He is currently on ASA and Plavix. Last took ASA 2 days ago. Last took Plavix this morning. PMH:  Past Medical History:   Diagnosis Date    CAD (coronary artery disease)     Gastrointestinal disorder     \"ulcer\"    H/O small bowel obstruction     Pyloric stenosis        PSH:  Past Surgical History:   Procedure Laterality Date    HX CORONARY STENT PLACEMENT         Allergies:   Allergies   Allergen Reactions    Morphine Rash       Home Medications:  None       Hospital Medications:  Current Facility-Administered Medications   Medication Dose Route Frequency    sodium chloride 0.9 % bolus infusion 1,000 mL  1,000 mL IntraVENous NOW    pantoprazole (PROTONIX) 80 mg in sodium chloride 0.9% 10 mL injection  80 mg IntraVENous ONCE    0.9% sodium chloride infusion 250 mL  250 mL IntraVENous PRN     No current outpatient prescriptions on file. Social History:  Social History   Substance Use Topics    Smoking status: Never Smoker    Smokeless tobacco: Never Used    Alcohol use No      Comment: social       Family History:  Family History   Problem Relation Age of Onset    Cancer Mother      breasta cancer survivor    Diabetes Father        Review of Systems:    Constitutional: negative fever, negative chills, negative weight loss  Eyes:   negative visual changes  ENT:   negative sore throat, tongue or lip swelling  Respiratory:  negative cough, negative dyspnea  Cards:  negative for chest pain, palpitations, lower extremity edema  GI:   See HPI  :  negative for frequency, dysuria  Integument:  negative for rash and pruritus  Heme:  negative for easy bruising and gum/nose bleeding  Musculoskel: negative for myalgias, back pain and muscle weakness  Neuro: negative for headaches, dizziness, vertigo  Psych:  negative for feelings of anxiety, depression      Objective:   Patient Vitals for the past 8 hrs:   BP Temp Pulse Resp SpO2 Height Weight   08/29/18 0914 138/86 98.1 °F (36.7 °C) 89 16 99 % 5' 7\" (1.702 m) 90.7 kg (200 lb)               PHYSICAL EXAM:  General: WD, WN. Alert, cooperative, no acute distress.    HEENT: NC, Atraumatic. PERRLA, EOMI. Anicteric sclerae. Lungs:  CTA Bilaterally. No Wheezing/Rhonchi/Rales. Heart:  Regular rate and rhythm, No murmur, No Rubs, No Gallops  Abdomen: Soft, non-distended, non-tender. Old surgical scars present.  +Bowel sounds, no HSM  Extremities: No c/c/e  Neurologic:  Alert and oriented X 3. No acute neurological distress. Psych:   Good insight. Not anxious nor agitated.     Data Review     Recent Labs      08/29/18   0929   WBC  5.3   HGB  7.3*   HCT  21.8*   PLT  283     No results for input(s): NA, K, CL, CO2, BUN, CREA, GLU, PHOS, CA in the last 72 hours. No results for input(s): SGOT, GPT, AP, TBIL, TP, ALB, GLOB, GGT, AML, LPSE in the last 72 hours. No lab exists for component: AMYP, HLPSE  No results for input(s): INR, PTP, APTT in the last 72 hours. No lab exists for component: INREXT     Imaging studies reviewed      Assessment:   1. GI bleed - with melena, in setting of anticoagulant use. Differentials include PUD, erosions, gastritis, esophagitis, etc.   2. Acute blood loss anemia   3. CAD s/p stent placement  4.  History of pyloric stenosis    Patient Active Problem List   Diagnosis Code    Partial small bowel obstruction ([facility] [address] K56.600    Small bowel obstruction due to postoperative adhesions K91.30    Hypokalemia E87.6              Plan:   -Admit under hospitalist  -Keep NPO   -IV PPI BID  -Plan for diagnostic EGD today  -Further recommendations to follow  -Consider Cardiology consult given recent stent placement and need for anticoagulation   -Monitor H&H and follow clinical course for any overt signs of bleeding  -Transfuse as necessary   -Discussed with Dr. Milind Blackburn  -Will follow along with you  -Thank you kindly for allowing us to participate in the care of this patient

## 2018-08-29 NOTE — H&P
HISTORY AND PHYSICAL 
 
 
PCP: Keiko Pearson MD 
History source: the patient CC: dark tarry stool HPI: this is a 49 y.o man with a history of CAD s/p recent stenting on ASA and Plavix, history of pyloric stenosis and remote gastric ulcer, who presents with melena and fatigue. Symptoms began about 5 days ago, he noticed dark red and black stools. He stopped taking aspirin on his own then but continued taking Plavix. There are no alleviating factors. He had fatigue around the same time, as well as occasional dizziness and palpitations. He denies hematemesis or abdominal pain. He tried going out for a run today but couldn't due to severe fatigue. He notes that about 2 years ago he was started on aspirin by his PCP and at that time had melena which resolved after he stopped the aspirin. PMH/PSH: 
Past Medical History:  
Diagnosis Date  CAD (coronary artery disease)  Gastrointestinal disorder \"ulcer\"  H/O small bowel obstruction  Pyloric stenosis Past Surgical History:  
Procedure Laterality Date [facility] meds:  
Prior to Admission medications Medication Sig Start Date End Date Taking? Authorizing Provider  
aspirin delayed-release 81 mg tablet Take 81 mg by mouth daily. Yes Historical Provider  
atorvastatin (LIPITOR) 40 mg tablet Take 40 mg by mouth every evening. Yes Historical Provider  
clopidogrel (PLAVIX) 75 mg tab Take 75 mg by mouth daily. Yes Historical Provider  
losartan (COZAAR) 25 mg tablet Take 12.5 mg by mouth daily. Yes Historical Provider  
docosahexanoic acid/epa (FISH OIL PO) Take 1 Cap by mouth daily. Yes Historical Provider  
ascorbate calcium (VITAMIN C PO) Take  by mouth. Yes Historical Provider  
cholecalciferol, vitamin D3, (VITAMIN D3 PO) Take  by mouth. Yes Historical Provider Allergies: Allergies Allergen Reactions  Morphine Rash FH: 
Family History Problem Relation Age of Onset  Cancer Mother   
  Kiran Salas cancer survivor  Diabetes Father Father had CHF, he has multiple cousins with CAD SH: Social History Substance Use Topics  Smoking status: Never Smoker  Smokeless tobacco: Never Used  Alcohol use No  
   Comment: social  
 
 
ROS: A comprehensive review of systems was negative except for that written in the HPI. PHYSICAL EXAM: 
Visit Vitals  /70 (BP Patient Position: At rest)  Pulse 76  Temp 98.1 °F (36.7 °C)  Resp 20  
 Ht 5' 7\" (1.702 m)  Wt 90.7 kg (200 lb)  SpO2 100%  BMI 31.32 kg/m2 Gen: NAD, non-toxic HEENT: anicteric sclerae, pale conjunctiva, oropharynx clear, MM moist 
Neck: supple, trachea midline, no adenopathy Heart: RRR, no MRG, no JVD, no peripheral edema Lungs: CTA b/l, non-labored respirations Abd: soft, NT, ND, BS+, no organomegaly Extr: warm Skin: dry, no rash Neuro: CN II-XII grossly intact, normal speech, moves all extremities Psych: normal mood, appropriate affect Labs/Imaging: 
Recent Results (from the past 24 hour(s)) EKG, 12 LEAD, INITIAL Collection Time: 08/29/18  9:22 AM  
Result Value Ref Range Ventricular Rate 86 BPM  
 Atrial Rate 86 BPM  
 P-R Interval 128 ms QRS Duration 84 ms Q-T Interval 378 ms QTC Calculation (Bezet) 452 ms Calculated P Axis 57 degrees Calculated R Axis 74 degrees Calculated T Axis 8 degrees Diagnosis Normal sinus rhythm No previous ECGs available CBC WITH AUTOMATED DIFF Collection Time: 08/29/18  9:29 AM  
Result Value Ref Range WBC 5.3 4.1 - 11.1 K/uL  
 RBC 2.42 (L) 4.10 - 5.70 M/uL HGB 7.3 (L) 12.1 - 17.0 g/dL HCT 21.8 (L) 36.6 - 50.3 % MCV 90.1 80.0 - 99.0 FL  
 MCH 30.2 26.0 - 34.0 PG  
 MCHC 33.5 30.0 - 36.5 g/dL  
 RDW 12.5 11.5 - 14.5 % PLATELET 835 925 - 424 K/uL MPV 9.1 8.9 - 12.9 FL  
 NRBC 0.0 0  WBC ABSOLUTE NRBC 0.00 0.00 - 0.01 K/uL NEUTROPHILS 61 32 - 75 % LYMPHOCYTES 30 12 - 49 % MONOCYTES 8 5 - 13 % EOSINOPHILS 1 0 - 7 % BASOPHILS 1 0 - 1 % IMMATURE GRANULOCYTES 0 0.0 - 0.5 % ABS. NEUTROPHILS 3.2 1.8 - 8.0 K/UL  
 ABS. LYMPHOCYTES 1.6 0.8 - 3.5 K/UL  
 ABS. MONOCYTES 0.4 0.0 - 1.0 K/UL  
 ABS. EOSINOPHILS 0.0 0.0 - 0.4 K/UL  
 ABS. BASOPHILS 0.0 0.0 - 0.1 K/UL  
 ABS. IMM. GRANS. 0.0 0.00 - 0.04 K/UL  
 DF AUTOMATED METABOLIC PANEL, COMPREHENSIVE Collection Time: 08/29/18  9:29 AM  
Result Value Ref Range Sodium 139 136 - 145 mmol/L Potassium 3.6 3.5 - 5.1 mmol/L Chloride 105 97 - 108 mmol/L  
 CO2 28 21 - 32 mmol/L Anion gap 6 5 - 15 mmol/L Glucose 111 (H) 65 - 100 mg/dL BUN 22 (H) 6 - 20 MG/DL Creatinine 1.05 0.70 - 1.30 MG/DL  
 BUN/Creatinine ratio 21 (H) 12 - 20 GFR est AA >60 >60 ml/min/1.73m2 GFR est non-AA >60 >60 ml/min/1.73m2 Calcium 7.5 (L) 8.5 - 10.1 MG/DL Bilirubin, total 0.3 0.2 - 1.0 MG/DL  
 ALT (SGPT) 43 12 - 78 U/L  
 AST (SGOT) 30 15 - 37 U/L Alk. phosphatase 54 45 - 117 U/L Protein, total 6.3 (L) 6.4 - 8.2 g/dL Albumin 3.6 3.5 - 5.0 g/dL Globulin 2.7 2.0 - 4.0 g/dL A-G Ratio 1.3 1.1 - 2.2 SAMPLES BEING HELD Collection Time: 08/29/18  9:29 AM  
Result Value Ref Range SAMPLES BEING HELD 1RED 1BLUE   
 COMMENT Add-on orders for these samples will be processed based on acceptable specimen integrity and analyte stability, which may vary by analyte. TROPONIN I Collection Time: 08/29/18  9:29 AM  
Result Value Ref Range Troponin-I, Qt. <0.05 <0.05 ng/mL NT-PRO BNP Collection Time: 08/29/18  9:29 AM  
Result Value Ref Range NT pro-BNP 8 0 - 125 PG/ML  
PTT Collection Time: 08/29/18  9:29 AM  
Result Value Ref Range aPTT 25.1 22.1 - 32.0 sec  
 aPTT, therapeutic range     58.0 - 77.0 SECS  
PROTHROMBIN TIME + INR Collection Time: 08/29/18  9:29 AM  
Result Value Ref Range INR 1.0 0.9 - 1.1 Prothrombin time 10.5 9.0 - 11.1 sec OCCULT BLOOD, STOOL Collection Time: 08/29/18  9:37 AM  
Result Value Ref Range Occult blood, stool POSITIVE (A) NEG    
TYPE + CROSSMATCH Collection Time: 08/29/18  9:40 AM  
Result Value Ref Range Crossmatch Expiration 09/01/2018 ABO/Rh(D) O POSITIVE Antibody screen NEG Recent Labs  
   08/29/18 
 7563 WBC  5.3 HGB  7.3* HCT  21.8*  
PLT  283 Recent Labs  
   08/29/18 
 8196 NA  139  
K  3.6 CL  105 CO2  28 BUN  22* CREA  1.05  
GLU  111* CA  7.5* Recent Labs  
   08/29/18 
 8191 SGOT  30 ALT  43 AP  54 TBILI  0.3 TP  6.3* ALB  3.6 GLOB  2.7 Recent Labs  
   08/29/18 
 7539 TROIQ  <0.05 Recent Labs  
   08/29/18 
 0245 INR  1.0 PTP  10.5 APTT  25.1 No results for input(s): PH, PCO2, PO2 in the last 72 hours. Xr Chest [facility] Result Date: 8/29/2018 IMPRESSION: Shallow volumes but clear lungs. Assessment & Plan:  this is a 49 y.o man with a history of CAD s/p recent stenting on ASA and Plavix, history of pyloric stenosis and remote gastric ulcer, who presents with GI bleeding. GI bleed with acute blood loss anemia: (POA) in the setting of dual antiplatelet therapy 
-agree with 2 U PRBC transfusion -IV fluids -IV PPI BID 
-monitor H/H 
-GI consult 
-hold ASA and Plavix until endoscopic studies CAD s/p stenting 7/2018:  
-continue atorvastatin 
-hold ASA and Plavix for now - last took Plavix this AM 
-pt states he contacted his cardiologist who said he can stop his ASA but needs to be continued on Plavix 
-will contact his cardiologist Dr. Ally Mc at [facility] to clarify DVT ppx: SCDs Code status: full Disposition: home when medically appropriate Signed By: Rosalio Martins MD   
 August 29, 2018

## 2018-08-30 LAB
ABO + RH BLD: NORMAL
ANION GAP SERPL CALC-SCNC: 9 MMOL/L (ref 5–15)
ATRIAL RATE: 86 BPM
BLD PROD TYP BPU: NORMAL
BLD PROD TYP BPU: NORMAL
BLOOD GROUP ANTIBODIES SERPL: NORMAL
BPU ID: NORMAL
BPU ID: NORMAL
BUN SERPL-MCNC: 12 MG/DL (ref 6–20)
BUN/CREAT SERPL: 13 (ref 12–20)
CALCIUM SERPL-MCNC: 7.9 MG/DL (ref 8.5–10.1)
CALCULATED P AXIS, ECG09: 57 DEGREES
CALCULATED R AXIS, ECG10: 74 DEGREES
CALCULATED T AXIS, ECG11: 8 DEGREES
CHLORIDE SERPL-SCNC: 108 MMOL/L (ref 97–108)
CO2 SERPL-SCNC: 24 MMOL/L (ref 21–32)
CREAT SERPL-MCNC: 0.95 MG/DL (ref 0.7–1.3)
CROSSMATCH RESULT,%XM: NORMAL
CROSSMATCH RESULT,%XM: NORMAL
DIAGNOSIS, 93000: NORMAL
ERYTHROCYTE [DISTWIDTH] IN BLOOD BY AUTOMATED COUNT: 13.2 % (ref 11.5–14.5)
GLUCOSE SERPL-MCNC: 121 MG/DL (ref 65–100)
HCT VFR BLD AUTO: 25 % (ref 36.6–50.3)
HCT VFR BLD AUTO: 25.7 % (ref 36.6–50.3)
HGB BLD-MCNC: 8.3 G/DL (ref 12.1–17)
HGB BLD-MCNC: 8.6 G/DL (ref 12.1–17)
MAGNESIUM SERPL-MCNC: 2.1 MG/DL (ref 1.6–2.4)
MCH RBC QN AUTO: 29.8 PG (ref 26–34)
MCHC RBC AUTO-ENTMCNC: 33.5 G/DL (ref 30–36.5)
MCV RBC AUTO: 88.9 FL (ref 80–99)
NRBC # BLD: 0 K/UL (ref 0–0.01)
NRBC BLD-RTO: 0 PER 100 WBC
P-R INTERVAL, ECG05: 128 MS
PHOSPHATE SERPL-MCNC: 2.6 MG/DL (ref 2.6–4.7)
PLATELET # BLD AUTO: 233 K/UL (ref 150–400)
PMV BLD AUTO: 9.4 FL (ref 8.9–12.9)
POTASSIUM SERPL-SCNC: 3.7 MMOL/L (ref 3.5–5.1)
Q-T INTERVAL, ECG07: 378 MS
QRS DURATION, ECG06: 84 MS
QTC CALCULATION (BEZET), ECG08: 452 MS
RBC # BLD AUTO: 2.89 M/UL (ref 4.1–5.7)
SODIUM SERPL-SCNC: 141 MMOL/L (ref 136–145)
SPECIMEN EXP DATE BLD: NORMAL
STATUS OF UNIT,%ST: NORMAL
STATUS OF UNIT,%ST: NORMAL
UNIT DIVISION, %UDIV: 0
UNIT DIVISION, %UDIV: 0
VENTRICULAR RATE, ECG03: 86 BPM
WBC # BLD AUTO: 6 K/UL (ref 4.1–11.1)

## 2018-08-30 PROCEDURE — 83735 ASSAY OF MAGNESIUM: CPT | Performed by: HOSPITALIST

## 2018-08-30 PROCEDURE — 74011250637 HC RX REV CODE- 250/637: Performed by: HOSPITALIST

## 2018-08-30 PROCEDURE — 65660000000 HC RM CCU STEPDOWN

## 2018-08-30 PROCEDURE — 74011000250 HC RX REV CODE- 250: Performed by: HOSPITALIST

## 2018-08-30 PROCEDURE — 74011250636 HC RX REV CODE- 250/636: Performed by: HOSPITALIST

## 2018-08-30 PROCEDURE — 85027 COMPLETE CBC AUTOMATED: CPT | Performed by: HOSPITALIST

## 2018-08-30 PROCEDURE — 36415 COLL VENOUS BLD VENIPUNCTURE: CPT | Performed by: HOSPITALIST

## 2018-08-30 PROCEDURE — 84100 ASSAY OF PHOSPHORUS: CPT | Performed by: HOSPITALIST

## 2018-08-30 PROCEDURE — C9113 INJ PANTOPRAZOLE SODIUM, VIA: HCPCS | Performed by: HOSPITALIST

## 2018-08-30 PROCEDURE — 80048 BASIC METABOLIC PNL TOTAL CA: CPT | Performed by: HOSPITALIST

## 2018-08-30 RX ORDER — LANOLIN ALCOHOL/MO/W.PET/CERES
1 CREAM (GRAM) TOPICAL 2 TIMES DAILY WITH MEALS
Status: DISCONTINUED | OUTPATIENT
Start: 2018-08-30 | End: 2018-08-31 | Stop reason: HOSPADM

## 2018-08-30 RX ORDER — ASPIRIN 81 MG/1
81 TABLET ORAL DAILY
Status: DISCONTINUED | OUTPATIENT
Start: 2018-08-30 | End: 2018-08-31 | Stop reason: HOSPADM

## 2018-08-30 RX ORDER — CLOPIDOGREL BISULFATE 75 MG/1
75 TABLET ORAL DAILY
Status: DISCONTINUED | OUTPATIENT
Start: 2018-08-30 | End: 2018-08-31 | Stop reason: HOSPADM

## 2018-08-30 RX ADMIN — CLOPIDOGREL BISULFATE 75 MG: 75 TABLET ORAL at 09:10

## 2018-08-30 RX ADMIN — Medication 10 ML: at 21:31

## 2018-08-30 RX ADMIN — Medication 10 ML: at 06:00

## 2018-08-30 RX ADMIN — SODIUM CHLORIDE 40 MG: 9 INJECTION, SOLUTION INTRAMUSCULAR; INTRAVENOUS; SUBCUTANEOUS at 21:31

## 2018-08-30 RX ADMIN — SODIUM CHLORIDE 40 MG: 9 INJECTION, SOLUTION INTRAMUSCULAR; INTRAVENOUS; SUBCUTANEOUS at 09:11

## 2018-08-30 RX ADMIN — FERROUS SULFATE TAB 325 MG (65 MG ELEMENTAL FE) 325 MG: 325 (65 FE) TAB at 09:09

## 2018-08-30 RX ADMIN — ASPIRIN 81 MG: 81 TABLET, COATED ORAL at 09:10

## 2018-08-30 RX ADMIN — FERROUS SULFATE TAB 325 MG (65 MG ELEMENTAL FE) 325 MG: 325 (65 FE) TAB at 17:09

## 2018-08-30 RX ADMIN — ATORVASTATIN CALCIUM 40 MG: 40 TABLET, FILM COATED ORAL at 17:09

## 2018-08-30 NOTE — PROGRESS NOTES
Primary Nurse Aleena Olivier RN and Iker Alexander RN performed a dual skin assessment on this patient No impairment noted Frank score is 23

## 2018-08-30 NOTE — PROGRESS NOTES
Laura Reyes [address] 
[address] [facility] [address] [place], [address] 
[phone] GI PROGRESS NOTE Melissa Escobar, [facility] Work Cell: [phone] NAME:   Chema Tinoco :    1970 MRN:    [id number] Assessment/Plan 1. GI bleed - s/p EGD demonstrating 1.5 cm linear ulcer at anastomosis with active oozing and 3 clips placed with cessation of bleeding. Hgb stable. - Diet as tolerated - Continue PPI BID 
- Discussed need to avoid ALL NSAID use 
- H. Pylori serology - Repeat EGD in 12 weeks 2. Acute blood loss anemia - related to the above, Hgb 8.6 this AM 
- Monitor H&H, transfuse as needed 3. CAD s/p stent placement - on ASA and Plavix - Cardiology input appreciated 4. History of pyloric stenosis - CT demonstrating intestinal malrotation Patient Active Problem List  
Diagnosis Code  Partial small bowel obstruction ([facility] [address] K56.600  Small bowel obstruction due to postoperative adhesions K91.30  Hypokalemia E87.6  GI bleed K92.2 Subjective:  
 
Feels \"like he has more color\" this morning. Denies any abdominal pain. Had a couple of dark stool s/p EGD. Tolerating diet. Hgb 8.6 this AM. Review of Systems Constitutional: negative fever, negative chills, negative weight loss Eyes:   negative visual changes ENT:   negative sore throat, tongue or lip swelling Respiratory:  negative cough, negative dyspnea Cards:  negative for chest pain, palpitations, lower extremity edema GI:   See HPI 
:  negative for frequency, dysuria Integument:  negative for rash and pruritus Heme:  negative for easy bruising and gum/nose bleeding Musculoskel: negative for myalgias, back pain and muscle weakness Neuro: negative for headaches, dizziness, vertigo Psych:  negative for feelings of anxiety, depression Objective: VITALS:  
Last 24hrs VS reviewed since prior hospitalist progress note. Most recent are: 
Visit Vitals  /83 (BP 1 Location: Left arm, BP Patient Position: At rest)  Pulse 78  Temp 98.2 °F (36.8 °C)  Resp 14  
 Ht 5' 7\" (1.702 m)  Wt 90.7 kg (200 lb)  SpO2 100%  BMI 31.32 kg/m2 Intake/Output Summary (Last 24 hours) at 08/30/18 1109 Last data filed at 08/30/18 4521 Gross per 24 hour Intake          1957.85 ml Output             2100 ml Net          -142.15 ml PHYSICAL EXAM: 
General   well developed, alert, in no acute distress EENT  Normocephalic, Atraumatic, PERRLA, EOMI, sclera clear Respiratory   Clear To Auscultation bilaterally - no wheezes, rales, rhonchi, or crackles Cardiology  Regular Rate and Rythmn  - no murmurs, rubs or gallops Abdominal  Soft, non-tender, non-distended, old surgical scar present, (+) BS, no HSM, no palpable mass Extremities  No clubbing, cyanosis, or edema. Pulses intact. Neurological  No focal neurological deficits noted Psychological  Oriented x 3. Normal affect. Lab Data Recent Results (from the past 12 hour(s)) HGB & HCT Collection Time: 08/29/18 11:36 PM  
Result Value Ref Range HGB 8.3 (L) 12.1 - 17.0 g/dL HCT 25.0 (L) 36.6 - 50.3 % CBC W/O DIFF Collection Time: 08/30/18  5:23 AM  
Result Value Ref Range WBC 6.0 4.1 - 11.1 K/uL  
 RBC 2.89 (L) 4.10 - 5.70 M/uL HGB 8.6 (L) 12.1 - 17.0 g/dL HCT 25.7 (L) 36.6 - 50.3 % MCV 88.9 80.0 - 99.0 FL  
 MCH 29.8 26.0 - 34.0 PG  
 MCHC 33.5 30.0 - 36.5 g/dL  
 RDW 13.2 11.5 - 14.5 % PLATELET 674 583 - 512 K/uL MPV 9.4 8.9 - 12.9 FL  
 NRBC 0.0 0  WBC ABSOLUTE NRBC 0.00 0.00 - 0.01 K/uL METABOLIC PANEL, BASIC Collection Time: 08/30/18  5:23 AM  
Result Value Ref Range Sodium 141 136 - 145 mmol/L Potassium 3.7 3.5 - 5.1 mmol/L Chloride 108 97 - 108 mmol/L  
 CO2 24 21 - 32 mmol/L Anion gap 9 5 - 15 mmol/L Glucose 121 (H) 65 - 100 mg/dL BUN 12 6 - 20 MG/DL  Creatinine 0.95 0.70 - 1.30 MG/DL  
 BUN/Creatinine ratio 13 12 - 20 GFR est AA >60 >60 ml/min/1.73m2 GFR est non-AA >60 >60 ml/min/1.73m2 Calcium 7.9 (L) 8.5 - 10.1 MG/DL MAGNESIUM Collection Time: 08/30/18  5:23 AM  
Result Value Ref Range Magnesium 2.1 1.6 - 2.4 mg/dL PHOSPHORUS Collection Time: 08/30/18  5:23 AM  
Result Value Ref Range Phosphorus 2.6 2.6 - 4.7 MG/DL Medications: Reviewed PMH/ reviewed - no change compared to H&P Mid-Level Provider: Lia Hong NP Date/Time:  8/30/2018

## 2018-08-30 NOTE — PROGRESS NOTES
Bedside shift change report given to Mera Tee (oncoming nurse) by Luane Shone (offgoing nurse). Report included the following information SBAR, Kardex and Recent Results.

## 2018-08-30 NOTE — PROGRESS NOTES
Hospitalist Progress Note Hospital summary: this is a 49 y.o man with a history of CAD s/p recent stenting on ASA and Plavix, history of pyloric stenosis and remote gastric ulcer, who presents with GI bleeding. He was found to have a bleeding anastomotic ulcer s/p clipping. 
   
 
 
Assessment/Plan: 
GI bleed with acute blood loss anemia: (POA) in the setting of dual antiplatelet therapy 
-s/p 2 U PRBC transfusion 
-IV PPI BID while here, will change to PO on discharge 
-monitor H/H 
-EGD showed 1.5 cm linear ulcer with active oozing at the anastomosis, s/p clipping 
-needs repeat EGD in 12 weeks -PO ferrous sulfate CAD s/p stenting 7/2018:  
-continue atorvastatin 
-resume ASA and Plavix, d/w [facility] cardiology, consulted cardiology here Code status: full DVT prophylaxis: SCDs Disposition: home tomorrow; would like to monitor an additional day for bleeding 
---------------------------------------------- 
 
CC: GI bleed S: feels better, had one black bm that was formed, no abd pain, no n/v/d, no dyspnea or chest pain Review of Systems: 
Pertinent items are noted in HPI. O: 
Visit Vitals  /83 (BP 1 Location: Left arm, BP Patient Position: At rest)  Pulse 78  Temp 98.2 °F (36.8 °C)  Resp 14  
 Ht 5' 7\" (1.702 m)  Wt 90.7 kg (200 lb)  SpO2 100%  BMI 31.32 kg/m2 PHYSICAL EXAM: 
Gen: NAD, non-toxic HEENT: anicteric sclerae, normal conjunctiva, Neck: supple Heart: RRR, no MRG, no JVD, no peripheral edema Lungs: CTA b/l, non-labored respirations Abd: soft, NT, ND, BS+ Extr: warm Skin: dry, no rash Neuro: CN II-XII grossly intact, normal speech, moves all extremities Psych: normal mood, appropriate affect Intake/Output Summary (Last 24 hours) at 08/30/18 0831 Last data filed at 08/30/18 8320 Gross per 24 hour Intake          1957.85 ml Output             2100 ml Net          -142.15 ml  
  
 
 Recent labs & imaging reviewed: 
Recent Results (from the past 24 hour(s)) EKG, 12 LEAD, INITIAL Collection Time: 08/29/18  9:22 AM  
Result Value Ref Range Ventricular Rate 86 BPM  
 Atrial Rate 86 BPM  
 P-R Interval 128 ms QRS Duration 84 ms Q-T Interval 378 ms QTC Calculation (Bezet) 452 ms Calculated P Axis 57 degrees Calculated R Axis 74 degrees Calculated T Axis 8 degrees Diagnosis Normal sinus rhythm No previous ECGs available CBC WITH AUTOMATED DIFF Collection Time: 08/29/18  9:29 AM  
Result Value Ref Range WBC 5.3 4.1 - 11.1 K/uL  
 RBC 2.42 (L) 4.10 - 5.70 M/uL HGB 7.3 (L) 12.1 - 17.0 g/dL HCT 21.8 (L) 36.6 - 50.3 % MCV 90.1 80.0 - 99.0 FL  
 MCH 30.2 26.0 - 34.0 PG  
 MCHC 33.5 30.0 - 36.5 g/dL  
 RDW 12.5 11.5 - 14.5 % PLATELET 061 256 - 765 K/uL MPV 9.1 8.9 - 12.9 FL  
 NRBC 0.0 0  WBC ABSOLUTE NRBC 0.00 0.00 - 0.01 K/uL NEUTROPHILS 61 32 - 75 % LYMPHOCYTES 30 12 - 49 % MONOCYTES 8 5 - 13 % EOSINOPHILS 1 0 - 7 % BASOPHILS 1 0 - 1 % IMMATURE GRANULOCYTES 0 0.0 - 0.5 % ABS. NEUTROPHILS 3.2 1.8 - 8.0 K/UL  
 ABS. LYMPHOCYTES 1.6 0.8 - 3.5 K/UL  
 ABS. MONOCYTES 0.4 0.0 - 1.0 K/UL  
 ABS. EOSINOPHILS 0.0 0.0 - 0.4 K/UL  
 ABS. BASOPHILS 0.0 0.0 - 0.1 K/UL  
 ABS. IMM. GRANS. 0.0 0.00 - 0.04 K/UL  
 DF AUTOMATED METABOLIC PANEL, COMPREHENSIVE Collection Time: 08/29/18  9:29 AM  
Result Value Ref Range Sodium 139 136 - 145 mmol/L Potassium 3.6 3.5 - 5.1 mmol/L Chloride 105 97 - 108 mmol/L  
 CO2 28 21 - 32 mmol/L Anion gap 6 5 - 15 mmol/L Glucose 111 (H) 65 - 100 mg/dL BUN 22 (H) 6 - 20 MG/DL Creatinine 1.05 0.70 - 1.30 MG/DL  
 BUN/Creatinine ratio 21 (H) 12 - 20 GFR est AA >60 >60 ml/min/1.73m2 GFR est non-AA >60 >60 ml/min/1.73m2 Calcium 7.5 (L) 8.5 - 10.1 MG/DL  Bilirubin, total 0.3 0.2 - 1.0 MG/DL  
 ALT (SGPT) 43 12 - 78 U/L  
 AST (SGOT) 30 15 - 37 U/L  
 Alk. phosphatase 54 45 - 117 U/L Protein, total 6.3 (L) 6.4 - 8.2 g/dL Albumin 3.6 3.5 - 5.0 g/dL Globulin 2.7 2.0 - 4.0 g/dL A-G Ratio 1.3 1.1 - 2.2 SAMPLES BEING HELD Collection Time: 08/29/18  9:29 AM  
Result Value Ref Range SAMPLES BEING HELD 1RED 1BLUE   
 COMMENT Add-on orders for these samples will be processed based on acceptable specimen integrity and analyte stability, which may vary by analyte. TROPONIN I Collection Time: 08/29/18  9:29 AM  
Result Value Ref Range Troponin-I, Qt. <0.05 <0.05 ng/mL NT-PRO BNP Collection Time: 08/29/18  9:29 AM  
Result Value Ref Range NT pro-BNP 8 0 - 125 PG/ML  
PTT Collection Time: 08/29/18  9:29 AM  
Result Value Ref Range aPTT 25.1 22.1 - 32.0 sec  
 aPTT, therapeutic range     58.0 - 77.0 SECS  
PROTHROMBIN TIME + INR Collection Time: 08/29/18  9:29 AM  
Result Value Ref Range INR 1.0 0.9 - 1.1 Prothrombin time 10.5 9.0 - 11.1 sec OCCULT BLOOD, STOOL Collection Time: 08/29/18  9:37 AM  
Result Value Ref Range Occult blood, stool POSITIVE (A) NEG    
TYPE + CROSSMATCH Collection Time: 08/29/18  9:40 AM  
Result Value Ref Range Crossmatch Expiration 09/01/2018 ABO/Rh(D) O POSITIVE Antibody screen NEG Unit number [id number] Blood component type [facility] Unit division 00 Status of unit TRANSFUSED Crossmatch result Compatible Unit number [id number] Blood component type [facility] Unit division 00 Status of unit TRANSFUSED Crossmatch result Compatible HGB & HCT Collection Time: 08/29/18 11:36 PM  
Result Value Ref Range HGB 8.3 (L) 12.1 - 17.0 g/dL HCT 25.0 (L) 36.6 - 50.3 % CBC W/O DIFF Collection Time: 08/30/18  5:23 AM  
Result Value Ref Range WBC 6.0 4.1 - 11.1 K/uL  
 RBC 2.89 (L) 4.10 - 5.70 M/uL HGB 8.6 (L) 12.1 - 17.0 g/dL HCT 25.7 (L) 36.6 - 50.3 %  MCV 88.9 80.0 - 99.0 FL  
 MCH 29.8 26.0 - 34.0 PG  
 MCHC 33.5 30.0 - 36.5 g/dL  
 RDW 13.2 11.5 - 14.5 % PLATELET 573 200 - 373 K/uL MPV 9.4 8.9 - 12.9 FL  
 NRBC 0.0 0  WBC ABSOLUTE NRBC 0.00 0.00 - 0.01 K/uL METABOLIC PANEL, BASIC Collection Time: 08/30/18  5:23 AM  
Result Value Ref Range Sodium 141 136 - 145 mmol/L Potassium 3.7 3.5 - 5.1 mmol/L Chloride 108 97 - 108 mmol/L  
 CO2 24 21 - 32 mmol/L Anion gap 9 5 - 15 mmol/L Glucose 121 (H) 65 - 100 mg/dL BUN 12 6 - 20 MG/DL Creatinine 0.95 0.70 - 1.30 MG/DL  
 BUN/Creatinine ratio 13 12 - 20 GFR est AA >60 >60 ml/min/1.73m2 GFR est non-AA >60 >60 ml/min/1.73m2 Calcium 7.9 (L) 8.5 - 10.1 MG/DL MAGNESIUM Collection Time: 08/30/18  5:23 AM  
Result Value Ref Range Magnesium 2.1 1.6 - 2.4 mg/dL PHOSPHORUS Collection Time: 08/30/18  5:23 AM  
Result Value Ref Range Phosphorus 2.6 2.6 - 4.7 MG/DL Recent Labs 08/30/18 
 6608  08/29/18 
 2336  08/29/18 
 6208 WBC  6.0   --   5.3 HGB  8.6*  8.3*  7.3* HCT  25.7*  25.0*  21.8*  
PLT  233   --   283 Recent Labs 08/30/18 
 7481  08/29/18 
 4710 NA  141  139  
K  3.7  3.6 CL  108  105 CO2  24  28 BUN  12  22* CREA  0.95  1.05  
GLU  121*  111* CA  7.9*  7.5* MG  2.1   --   
PHOS  2.6   --   
 
Recent Labs  
   08/29/18 
 3873 SGOT  30 ALT  43 AP  54 TBILI  0.3 TP  6.3* ALB  3.6 GLOB  2.7 Recent Labs  
   08/29/18 
 4827 INR  1.0 PTP  10.5 APTT  25.1 No results for input(s): FE, TIBC, PSAT, FERR in the last 72 hours. No results found for: FOL, RBCF No results for input(s): PH, PCO2, PO2 in the last 72 hours. Recent Labs  
   08/29/18 
 4209 TROIQ  <0.05 No results found for: CHOL, CHOLX, CHLST, CHOLV, HDL, LDL, LDLC, DLDLP, TGLX, TRIGL, TRIGP, CHHD, CHHDX No results found for: Karen Mayorga Lab Results Component Value Date/Time  Color YELLOW/STRAW 09/04/2014 07:10 PM  
 Appearance CLEAR 09/04/2014 07:10 PM  
 Specific gravity 1.027 09/04/2014 07:10 PM  
 pH (UA) 5.5 09/04/2014 07:10 PM  
 Protein NEGATIVE  09/04/2014 07:10 PM  
 Glucose NEGATIVE  09/04/2014 07:10 PM  
 Ketone NEGATIVE  09/04/2014 07:10 PM  
 Bilirubin NEGATIVE  09/04/2014 07:10 PM  
 Urobilinogen 1.0 09/04/2014 07:10 PM  
 Nitrites NEGATIVE  09/04/2014 07:10 PM  
 Leukocyte Esterase NEGATIVE  09/04/2014 07:10 PM  
 Epithelial cells FEW 09/04/2014 07:10 PM  
 Bacteria NEGATIVE  09/04/2014 07:10 PM  
 WBC 0-4 09/04/2014 07:10 PM  
 RBC 0-5 09/04/2014 07:10 PM  
 
 
Med list reviewed Current Facility-Administered Medications Medication Dose Route Frequency  aspirin delayed-release tablet 81 mg  81 mg Oral DAILY  clopidogrel (PLAVIX) tablet 75 mg  75 mg Oral DAILY  ferrous sulfate tablet 325 mg  1 Tab Oral BID WITH MEALS  
 0.9% sodium chloride infusion 250 mL  250 mL IntraVENous PRN  
 atorvastatin (LIPITOR) tablet 40 mg  40 mg Oral QPM  
 sodium chloride (NS) flush 5-10 mL  5-10 mL IntraVENous Q8H  
 sodium chloride (NS) flush 5-10 mL  5-10 mL IntraVENous PRN  
 acetaminophen (TYLENOL) tablet 650 mg  650 mg Oral Q4H PRN  
 ondansetron (ZOFRAN) injection 4 mg  4 mg IntraVENous Q4H PRN  pantoprazole (PROTONIX) 40 mg in sodium chloride 0.9% 10 mL injection  40 mg IntraVENous Q12H Care Plan discussed with:  Patient/Family Jeri Jin MD 
Internal Medicine Date of Service: 8/30/2018

## 2018-08-30 NOTE — CONSULTS
Cardiology Consult    Patient: Tarah Sebastian MRN: [id number]  SSN: [id number]    YOB: 1970  Age: 49 y.o. Sex: male       Subjective:      Date of  Admission: 8/29/2018     Admission type: Emergency    Tarah Sebastian is a 49 y.o. male admitted for GI bleed;GI Bleed. Reason: CAD recent stent, GI bleed  Referring: Dr. Melchor Sanchez  Primary cardiologist: JOHAN Hawk    Reviewed events for the past day. Pt with recent RASHEL to the mid LAD in 2017 and admit now with GI bleed, h/o pyloric stenosis and GI ulcer. He was having progressive fatigue and melena. EGD yesterday demonstrated 1.5 cm linear ulcer with active oozing along the lateral anastamosis of prior surgical pyloroplasty; 3 clips were placed for oozing. Denies any chest pain, shortness of breath or palpitations.         Primary Care Provider: Domitila Overton MD  Past Medical History:   Diagnosis Date    CAD (coronary artery disease)     Gastrointestinal disorder     \"ulcer\"   [facility] H/O small bowel obstruction     Pyloric stenosis       Past Surgical History:   Procedure Laterality Date    HX CORONARY STENT PLACEMENT       Family History   Problem Relation Age of Onset    Cancer Mother      breasta cancer survivor    Diabetes Father       Social History   Substance Use Topics    Smoking status: Never Smoker    Smokeless tobacco: Never Used    Alcohol use No      Comment: social      Current Facility-Administered Medications   Medication Dose Route Frequency    aspirin delayed-release tablet 81 mg  81 mg Oral DAILY    clopidogrel (PLAVIX) tablet 75 mg  75 mg Oral DAILY    0.9% sodium chloride infusion 250 mL  250 mL IntraVENous PRN    atorvastatin (LIPITOR) tablet 40 mg  40 mg Oral QPM    sodium chloride (NS) flush 5-10 mL  5-10 mL IntraVENous Q8H    sodium chloride (NS) flush 5-10 mL  5-10 mL IntraVENous PRN    acetaminophen (TYLENOL) tablet 650 mg  650 mg Oral Q4H PRN    ondansetron (ZOFRAN) injection 4 mg  4 mg IntraVENous Q4H PRN    pantoprazole (PROTONIX) 40 mg in sodium chloride 0.9% 10 mL injection  40 mg IntraVENous Q12H    0.9% sodium chloride infusion  75 mL/hr IntraVENous CONTINUOUS        Allergies   Allergen Reactions    Morphine Rash        Review of Systems:  A comprehensive review of systems was negative except for that written in the History of Present Illness. Subjective:     Visit Vitals    /82 (BP 1 Location: Left arm, BP Patient Position: At rest)    Pulse 78    Temp 97.3 °F (36.3 °C)    Resp 16    Ht 5' 7\" (1.702 m)    Wt 200 lb (90.7 kg)    SpO2 99%    BMI 31.32 kg/m2        Physical Exam:  Visit Vitals    /83 (BP 1 Location: Left arm, BP Patient Position: At rest)    Pulse 78    Temp 98.2 °F (36.8 °C)    Resp 14    Ht 5' 7\" (1.702 m)    Wt 200 lb (90.7 kg)    SpO2 100%    BMI 31.32 kg/m2     General Appearance:  Well developed, well nourished,alert and oriented x 3, and individual in no acute distress. Ears/Nose/Mouth/Throat:   Hearing grossly normal.         Neck: Supple. Chest:   Lungs clear to auscultation bilaterally. Cardiovascular:  Regular rate and rhythm, S1, S2 normal, no murmur. Abdomen:   Soft, non-tender, bowel sounds are active. Extremities: No edema bilaterally. Skin: Warm and dry. Cardiographics:  Telemetry: normal sinus rhythm  ECG: normal sinus rhythm, nonspecific ST and T waves changes      Data Reviewed: All lab results for the last 24 hours reviewed. Assessment:         Hospital Problems  Date Reviewed: 9/4/2014          Codes Class Noted POA    * (Principal)GI bleed ICD-10-CM: K92.2  ICD-9-CM: 578.9  8/29/2018 Yes               Plan:     CAD s/p RASHEL to LAD 7/2018. No active cardiac issues, but with GI bleed there were questions of anticoagulation. With recent drug eluting stent, aspirin 81 mg and plavix 75 recommended to continue for at least 6 months. Discussed with Dr. Surya Fields yesterday.   Stressed importance to pt of avoid NSAID, alcohol or anything that would thin the blood. No additional cardiac evaluation indicated at this time and will sign off. Close f/u recommended with his cardiologist at [facility]. GI bleed s/p EGD and clip.     Signed By: Russ Fernandez MD     August 30, 2018

## 2018-08-31 VITALS
HEART RATE: 84 BPM | SYSTOLIC BLOOD PRESSURE: 122 MMHG | BODY MASS INDEX: 31.39 KG/M2 | HEIGHT: 67 IN | WEIGHT: 200 LBS | OXYGEN SATURATION: 99 % | TEMPERATURE: 98.6 F | RESPIRATION RATE: 18 BRPM | DIASTOLIC BLOOD PRESSURE: 75 MMHG

## 2018-08-31 PROBLEM — K92.2 GI BLEED: Status: RESOLVED | Noted: 2018-08-29 | Resolved: 2018-08-31

## 2018-08-31 LAB
COMMENT, HOLDF: NORMAL
HCT VFR BLD AUTO: 26.4 % (ref 36.6–50.3)
HGB BLD-MCNC: 8.9 G/DL (ref 12.1–17)
SAMPLES BEING HELD,HOLD: NORMAL

## 2018-08-31 PROCEDURE — 74011250637 HC RX REV CODE- 250/637: Performed by: HOSPITALIST

## 2018-08-31 PROCEDURE — 74011250636 HC RX REV CODE- 250/636: Performed by: HOSPITALIST

## 2018-08-31 PROCEDURE — 36415 COLL VENOUS BLD VENIPUNCTURE: CPT | Performed by: HOSPITALIST

## 2018-08-31 PROCEDURE — 85018 HEMOGLOBIN: CPT | Performed by: HOSPITALIST

## 2018-08-31 PROCEDURE — 86677 HELICOBACTER PYLORI ANTIBODY: CPT | Performed by: NURSE PRACTITIONER

## 2018-08-31 PROCEDURE — 74011000250 HC RX REV CODE- 250: Performed by: HOSPITALIST

## 2018-08-31 PROCEDURE — C9113 INJ PANTOPRAZOLE SODIUM, VIA: HCPCS | Performed by: HOSPITALIST

## 2018-08-31 RX ORDER — PANTOPRAZOLE SODIUM 40 MG/1
40 TABLET, DELAYED RELEASE ORAL 2 TIMES DAILY
Qty: 60 TAB | Refills: 0 | Status: SHIPPED | OUTPATIENT
Start: 2018-08-31 | End: 2018-09-30

## 2018-08-31 RX ORDER — LANOLIN ALCOHOL/MO/W.PET/CERES
325 CREAM (GRAM) TOPICAL 2 TIMES DAILY WITH MEALS
Qty: 60 TAB | Refills: 0 | Status: SHIPPED | OUTPATIENT
Start: 2018-08-31 | End: 2019-05-01

## 2018-08-31 RX ADMIN — ASPIRIN 81 MG: 81 TABLET, COATED ORAL at 09:02

## 2018-08-31 RX ADMIN — FERROUS SULFATE TAB 325 MG (65 MG ELEMENTAL FE) 325 MG: 325 (65 FE) TAB at 07:14

## 2018-08-31 RX ADMIN — Medication 10 ML: at 07:15

## 2018-08-31 RX ADMIN — SODIUM CHLORIDE 40 MG: 9 INJECTION, SOLUTION INTRAMUSCULAR; INTRAVENOUS; SUBCUTANEOUS at 09:02

## 2018-08-31 RX ADMIN — CLOPIDOGREL BISULFATE 75 MG: 75 TABLET ORAL at 09:02

## 2018-08-31 NOTE — DISCHARGE INSTRUCTIONS
Discharge Instructions       PATIENT ID: Chip Botello  MRN: [id number]   YOB: 1970    DATE OF ADMISSION: 8/29/2018  9:15 AM    DATE OF DISCHARGE: 8/31/2018    PRIMARY CARE PROVIDER: Coty Lindsay MD     ATTENDING PHYSICIAN: Lily Owen MD  DISCHARGING PROVIDER: Lily Owen MD    To contact this individual call [phone] and ask the  to page. If unavailable ask to be transferred the Adult Hospitalist Department. DISCHARGE DIAGNOSES Gastrointestinal bleed, stomach ulcer, anemia    CONSULTATIONS: IP CONSULT TO GASTROENTEROLOGY  IP CONSULT TO GASTROENTEROLOGY  IP CONSULT TO CARDIOLOGY    PROCEDURES/SURGERIES: Procedure(s):  ESOPHAGOGASTRODUODENOSCOPY (EGD)  RESOLUTION CLIP    PENDING TEST RESULTS:   At the time of discharge the following test results are still pending: H. Pylori antibody panel    FOLLOW UP APPOINTMENTS:   Follow-up Information     Follow up With Details Comments Contact Info    Coty Lindsay MD Schedule an appointment as soon as possible for a visit in 1 week for hospital discharge follow-up [address] [place] [address]      Herbert Jimenes MD Schedule an appointment as soon as possible for a visit in 12 weeks for repeat endoscopy [address] [place] [address] [place] [facility]      Saul Ennis MD Schedule an appointment as soon as possible for a visit  [address] [facility]  [phone]             ADDITIONAL CARE RECOMMENDATIONS:     You were found to have a bleeding stomach ulcer and are being prescribed an acid suppressing medication to help it heal; will need to follow-up with your primary doctor or gastroenterologist to have this refilled (you're being given a month's supply but will need to be on it for at least 3 months). You will need a repeat endoscopy in 3 months to ensure the ulcer is adequately healed.     You need to keep taking the aspirin and Plavix so your heart stent doesn't clot; follow-up with your cardiologist.    Anselmo Mcdonald are being prescribed iron supplements to help with the anemia; however, a regular, balanced diet would be adequate. The iron tablets can be stopped if they cause stomach upset. DIET: Cardiac Diet    ACTIVITY: Activity as tolerated    DISCHARGE MEDICATIONS:   See Medication Reconciliation Form    · It is important that you take the medication exactly as they are prescribed. · Keep your medication in the bottles provided by the pharmacist and keep a list of the medication names, dosages, and times to be taken in your wallet. · Do not take other medications without consulting your doctor. NOTIFY YOUR PHYSICIAN FOR ANY OF THE FOLLOWING:   Fever over 101 degrees for 24 hours. Chest pain, shortness of breath, fever, chills, nausea, vomiting, diarrhea, change in mentation, falling, weakness, bleeding. Severe pain or pain not relieved by medications. Or, any other signs or symptoms that you may have questions about.       DISPOSITION:   x Home With:   OT  PT  HH  RN       SNF/Inpatient Rehab/LTAC    Independent/assisted living    Hospice    Other:     CDMP Checked:   Yes x     PROBLEM LIST Updated:  Yes x       Signed:   Tres Brown MD  8/31/2018  7:40 AM

## 2018-08-31 NOTE — DISCHARGE SUMMARY
Discharge Summary     Patient: Home Harden MRN: [id number]  SSN: [id number]    YOB: 1970  Age: 49 y.o. Sex: male       Admit Date: 8/29/2018    Discharge Date: 8/31/2018      Admission Diagnoses: GI bleed    Discharge Diagnoses:   GI bleed  Gastric ulcer  Acute blood loss anemia  CAD s/p stenting     Discharge Condition: Stable    Hospital Course: this is a 49 y.o man with a history of CAD s/p recent stenting on ASA and Plavix, history of pyloric stenosis and remote gastric ulcer, who presented with GI bleeding. He was found to have a bleeding anastomotic ulcer s/p clipping. He received 2 U PRBC's; hemoglobin remained stable. Aspirin and Plavix were resumed after risk-benefit discussions and there was no further evidence of bleeding. H. Pylori antibody testing is pending    Consults: Cardiology and Gastroenterology    Significant Diagnostic Studies: see above    Disposition: home    S: feels well, no bleeding, no pain, dizziness - wants to go home. Visit Vitals    /75 (BP 1 Location: Left arm, BP Patient Position: Sitting)    Pulse 84    Temp 98.6 °F (37 °C)    Resp 18    Ht 5' 7\" (1.702 m)    Wt 90.7 kg (200 lb)    SpO2 99%    BMI 31.32 kg/m2     NAD, non-toxic   Anicteric sclerae  Ambulating without difficulty      Discharge Medications:   Current Discharge Medication List      START taking these medications    Details   pantoprazole (PROTONIX) 40 mg tablet Take 1 Tab by mouth two (2) times a day for 30 days. Qty: 60 Tab, Refills: 0      ferrous sulfate 325 mg (65 mg iron) tablet Take 1 Tab by mouth two (2) times daily (with meals). Qty: 60 Tab, Refills: 0         CONTINUE these medications which have NOT CHANGED    Details   aspirin delayed-release 81 mg tablet Take 81 mg by mouth daily. atorvastatin (LIPITOR) 40 mg tablet Take 40 mg by mouth every evening. clopidogrel (PLAVIX) 75 mg tab Take 75 mg by mouth daily.       losartan (COZAAR) 25 mg tablet Take 12.5 mg by mouth daily. docosahexanoic acid/epa (FISH OIL PO) Take 1 Cap by mouth daily. ascorbate calcium (VITAMIN C PO) Take  by mouth. cholecalciferol, vitamin D3, (VITAMIN D3 PO) Take  by mouth. Follow-up Information     Follow up With Details Comments Анна Gutierrez MD Schedule an appointment as soon as possible for a visit in 1 week for hospital discharge follow-up [facility] [address] [facility]      Mark Hayes MD Schedule an appointment as soon as possible for a visit in 12 weeks for repeat endoscopy [address] [place] [address] [place] [facility]      Leslie Ramesh MD Schedule an appointment as soon as possible for a visit  [facility] [address] [id number]  [phone]            Signed By: Tyler Noriega MD     August 31, 2018      Greater than 30 minutes spent on discharge management.

## 2018-08-31 NOTE — ROUTINE PROCESS
Bedside and Verbal shift change report given to Tammy Kitchen RN (oncoming nurse) by Gudelia Hart RN (offgoing nurse). Report included the following information SBAR, Kardex, Intake/Output, MAR, Accordion and Recent Results.

## 2018-08-31 NOTE — PROGRESS NOTES
[address] [facility] [address] 
[address] [facility] [address] [place], [address] 
[phone] GI PROGRESS NOTE Rose Li AGACNP-BC Work Cell: [phone] NAME:   Lyric Pérez :    1970 MRN:    [id number] Assessment/Plan 1. GI bleed - s/p EGD demonstrating 1.5 cm linear ulcer at anastomosis with active oozing and 3 clips placed with cessation of bleeding. Hgb stable. - Diet as tolerated - Continue PPI BID 
- Discussed need to avoid ALL NSAID use 
- H. Pylori serology pending - Repeat EGD in 12 weeks 2. Acute blood loss anemia - related to the above, Hgb 8.6 this AM 
- Monitor H&H, transfuse as needed 3. CAD s/p stent placement - on ASA and Plavix - Cardiology input appreciated 4. History of pyloric stenosis - CT demonstrating intestinal malrotation Okay to discharge from GI standpoint to follow-up outpatient to complete the above. Patient Active Problem List  
Diagnosis Code  Partial small bowel obstruction ([facility] [address] K56.600  Small bowel obstruction due to postoperative adhesions K91.30  Hypokalemia E87.6 Subjective:  
 
Feeling well. Denies any abdominal pain. Tolerating diet. Hgb stable. Review of Systems Constitutional: negative fever, negative chills, negative weight loss Eyes:   negative visual changes ENT:   negative sore throat, tongue or lip swelling Respiratory:  negative cough, negative dyspnea Cards:  negative for chest pain, palpitations, lower extremity edema GI:   See HPI 
:  negative for frequency, dysuria Integument:  negative for rash and pruritus Heme:  negative for easy bruising and gum/nose bleeding Musculoskel: negative for myalgias,  back pain and muscle weakness Neuro: negative for headaches, dizziness, vertigo Psych:  negative for feelings of anxiety, depression Objective: VITALS:  
Last 24hrs VS reviewed since prior hospitalist progress note. Most recent are: 
Visit Vitals  /75 (BP 1 Location: Left arm, BP Patient Position: Sitting)  Pulse 84  Temp 98.6 °F (37 °C)  Resp 18  Ht 5' 7\" (1.702 m)  Wt 90.7 kg (200 lb)  SpO2 99%  BMI 31.32 kg/m2 No intake or output data in the 24 hours ending 08/31/18 0809 PHYSICAL EXAM: 
General   well developed, alert, in no acute distress EENT  Normocephalic, Atraumatic, PERRLA, EOMI, sclera clear Respiratory   Clear To Auscultation bilaterally - no wheezes, rales, rhonchi, or crackles Cardiology  Regular Rate and Rythmn  - no murmurs, rubs or gallops Abdominal  Soft, non-tender, non-distended, old surgical scar present, (+) BS, no HSM, no palpable mass Extremities  No clubbing, cyanosis, or edema. Pulses intact. Neurological  No focal neurological deficits noted Psychological  Oriented x 3. Normal affect. Lab Data Recent Results (from the past 12 hour(s)) HGB & HCT Collection Time: 08/31/18  4:08 AM  
Result Value Ref Range HGB 8.9 (L) 12.1 - 17.0 g/dL HCT 26.4 (L) 36.6 - 50.3 % SAMPLES BEING HELD Collection Time: 08/31/18  4:08 AM  
Result Value Ref Range SAMPLES BEING HELD 1PST COMMENT Add-on orders for these samples will be processed based on acceptable specimen integrity and analyte stability, which may vary by analyte. Medications: Reviewed PMH/ reviewed - no change compared to H&P Mid-Level Provider: Kathy Hart NP Date/Time:  8/31/2018 I have personally reviewed the history and independently examined the patient. I have reviewed the chart and agree with the documentation recorded by the Mid Level Provider, including the assessment, treatment plan, and disposition.  
 
Lucian Ibarra MD

## 2018-08-31 NOTE — PROGRESS NOTES
[facility] follow-up appointment on Thursday September 6,2018 @ 3:00 p.m. with Elsa Holcomb. Added to AVS. Taylor Elias CM Specialist

## 2018-09-04 LAB
H PYLORI IGA SER-ACNC: <9 UNITS (ref 0–8.9)
H PYLORI IGG SER IA-ACNC: <0.8 INDEX VALUE (ref 0–0.79)
H PYLORI IGM SER-ACNC: <9 UNITS (ref 0–8.9)

## 2019-04-30 ENCOUNTER — DOCUMENTATION ONLY (OUTPATIENT)
Dept: GASTROENTEROLOGY | Age: 49
End: 2019-04-30

## 2019-05-01 ENCOUNTER — ANESTHESIA EVENT (OUTPATIENT)
Dept: ENDOSCOPY | Age: 49
End: 2019-05-01
Payer: COMMERCIAL

## 2019-05-01 RX ORDER — IPRATROPIUM BROMIDE 42 UG/1
2 SPRAY, METERED NASAL
COMMUNITY

## 2019-05-01 RX ORDER — GLUCOSAMINE SULFATE 1500 MG
1000 POWDER IN PACKET (EA) ORAL DAILY
COMMUNITY

## 2019-05-01 RX ORDER — VITAMIN E 1000 UNIT
1000 CAPSULE ORAL DAILY
COMMUNITY

## 2019-05-01 RX ORDER — AMOXICILLIN 500 MG/1
500 CAPSULE ORAL 3 TIMES DAILY
COMMUNITY

## 2019-05-01 RX ORDER — PANTOPRAZOLE SODIUM 40 MG/1
40 TABLET, DELAYED RELEASE ORAL DAILY
COMMUNITY

## 2019-05-01 RX ORDER — LEVOCETIRIZINE DIHYDROCHLORIDE 5 MG/1
5 TABLET, FILM COATED ORAL DAILY
COMMUNITY

## 2019-05-01 NOTE — ANESTHESIA PREPROCEDURE EVALUATION
Relevant Problems No relevant active problems Anesthetic History No history of anesthetic complications Review of Systems / Medical History Patient summary reviewed, nursing notes reviewed and pertinent labs reviewed Pulmonary Within defined limits Neuro/Psych Within defined limits Cardiovascular CAD and cardiac stents GI/Hepatic/Renal 
Within defined limits Endo/Other Obesity Other Findings Physical Exam 
 
Airway Mallampati: II 
TM Distance: > 6 cm Neck ROM: normal range of motion Mouth opening: Normal 
 
 Cardiovascular Regular rate and rhythm,  S1 and S2 normal,  no murmur, click, rub, or gallop Dental 
No notable dental hx Pulmonary Breath sounds clear to auscultation Abdominal 
GI exam deferred Other Findings Anesthetic Plan ASA: 3 Anesthesia type: MAC Anesthetic plan and risks discussed with: Patient

## 2019-05-02 ENCOUNTER — ANESTHESIA (OUTPATIENT)
Dept: ENDOSCOPY | Age: 49
End: 2019-05-02
Payer: COMMERCIAL

## 2019-05-02 ENCOUNTER — HOSPITAL ENCOUNTER (OUTPATIENT)
Age: 49
Setting detail: OUTPATIENT SURGERY
Discharge: HOME | End: 2019-05-02
Attending: INTERNAL MEDICINE | Admitting: INTERNAL MEDICINE
Payer: COMMERCIAL

## 2019-05-02 VITALS
TEMPERATURE: 98.6 F | HEIGHT: 67 IN | OXYGEN SATURATION: 100 % | RESPIRATION RATE: 17 BRPM | WEIGHT: 180 LBS | BODY MASS INDEX: 28.25 KG/M2 | SYSTOLIC BLOOD PRESSURE: 109 MMHG | DIASTOLIC BLOOD PRESSURE: 81 MMHG

## 2019-05-02 PROCEDURE — 77030010937 HC CLP LIG BSC -I: Performed by: INTERNAL MEDICINE

## 2019-05-02 PROCEDURE — 76040000019: Performed by: INTERNAL MEDICINE

## 2019-05-02 PROCEDURE — 76060000031 HC ANESTHESIA FIRST 0.5 HR: Performed by: INTERNAL MEDICINE

## 2019-05-02 PROCEDURE — 74011250636 HC RX REV CODE- 250/636

## 2019-05-02 RX ORDER — SODIUM CHLORIDE 0.9 % (FLUSH) 0.9 %
5-40 SYRINGE (ML) INJECTION AS NEEDED
Status: CANCELLED | OUTPATIENT
Start: 2019-05-02

## 2019-05-02 RX ORDER — SODIUM CHLORIDE 9 MG/ML
50 INJECTION, SOLUTION INTRAVENOUS CONTINUOUS
Status: CANCELLED | OUTPATIENT
Start: 2019-05-02 | End: 2019-05-02

## 2019-05-02 RX ORDER — FENTANYL CITRATE 50 UG/ML
100 INJECTION, SOLUTION INTRAMUSCULAR; INTRAVENOUS
Status: CANCELLED | OUTPATIENT
Start: 2019-05-02

## 2019-05-02 RX ORDER — SODIUM CHLORIDE 0.9 % (FLUSH) 0.9 %
5-40 SYRINGE (ML) INJECTION EVERY 8 HOURS
Status: CANCELLED | OUTPATIENT
Start: 2019-05-02

## 2019-05-02 RX ORDER — ATROPINE SULFATE 0.1 MG/ML
0.5 INJECTION INTRAVENOUS
Status: CANCELLED | OUTPATIENT
Start: 2019-05-02 | End: 2019-05-03

## 2019-05-02 RX ORDER — NALOXONE HYDROCHLORIDE 0.4 MG/ML
0.4 INJECTION, SOLUTION INTRAMUSCULAR; INTRAVENOUS; SUBCUTANEOUS
Status: CANCELLED | OUTPATIENT
Start: 2019-05-02 | End: 2019-05-02

## 2019-05-02 RX ORDER — EPINEPHRINE 0.1 MG/ML
1 INJECTION INTRACARDIAC; INTRAVENOUS
Status: CANCELLED | OUTPATIENT
Start: 2019-05-02 | End: 2019-05-03

## 2019-05-02 RX ORDER — FLUMAZENIL 0.1 MG/ML
0.2 INJECTION INTRAVENOUS
Status: CANCELLED | OUTPATIENT
Start: 2019-05-02 | End: 2019-05-02

## 2019-05-02 RX ORDER — MIDAZOLAM HYDROCHLORIDE 1 MG/ML
.25-1 INJECTION, SOLUTION INTRAMUSCULAR; INTRAVENOUS
Status: CANCELLED | OUTPATIENT
Start: 2019-05-02 | End: 2019-05-02

## 2019-05-02 RX ORDER — PROPOFOL 10 MG/ML
INJECTION, EMULSION INTRAVENOUS AS NEEDED
Status: DISCONTINUED | OUTPATIENT
Start: 2019-05-02 | End: 2019-05-02 | Stop reason: HOSPADM

## 2019-05-02 RX ORDER — SODIUM CHLORIDE 9 MG/ML
INJECTION, SOLUTION INTRAVENOUS
Status: DISCONTINUED | OUTPATIENT
Start: 2019-05-02 | End: 2019-05-02 | Stop reason: HOSPADM

## 2019-05-02 RX ORDER — LIDOCAINE HYDROCHLORIDE 20 MG/ML
INJECTION, SOLUTION EPIDURAL; INFILTRATION; INTRACAUDAL; PERINEURAL AS NEEDED
Status: DISCONTINUED | OUTPATIENT
Start: 2019-05-02 | End: 2019-05-02 | Stop reason: HOSPADM

## 2019-05-02 RX ORDER — DEXTROMETHORPHAN/PSEUDOEPHED 2.5-7.5/.8
1.2 DROPS ORAL
Status: CANCELLED | OUTPATIENT
Start: 2019-05-02

## 2019-05-02 RX ADMIN — PROPOFOL 80 MG: 10 INJECTION, EMULSION INTRAVENOUS at 11:35

## 2019-05-02 RX ADMIN — SODIUM CHLORIDE: 9 INJECTION, SOLUTION INTRAVENOUS at 11:18

## 2019-05-02 RX ADMIN — LIDOCAINE HYDROCHLORIDE 40 MG: 20 INJECTION, SOLUTION EPIDURAL; INFILTRATION; INTRACAUDAL; PERINEURAL at 11:35

## 2019-05-02 RX ADMIN — PROPOFOL 50 MG: 10 INJECTION, EMULSION INTRAVENOUS at 11:38

## 2019-05-02 NOTE — PROCEDURES
118 [facility] (formerly [facility])[3].  217 [facility] [address] [place]  1400 [facility], [address]   [phone]                            NAME:  Emilia Sharma   :   1970   MRN:   [id number]     Date/Time:  2019 11:53 AM    Esophagogastroduodenoscopy (EGD) Procedure Note    :  Lakeisha Carballo MD    Referring Provider:  Jina Alcala MD    Anethesia/Sedation:  MAC anesthesia    Procedure Details   After infomed consent was obtained for the procedure, with all risks and benefits of procedure explained the patient was taken to the endoscopy suite and placed in the left lateral decubitus position. Following sequential administration of sedation as per above, the gastroscope was inserted into the mouth and advanced under direct vision to proximal jejunum. A careful inspection was made as the gastroscope was withdrawn, including a retroflexed view of the proximal stomach; findings and interventions are described below. Findings:  Esophagus:normal mucosa, irregular Z-line 37 cm from the incisors  Stomach:Evidence of prior surgical intervention distal stomach with probable gastro-jejunostomy, 8 mm ulcer located at anastomosis, mostly clean based though with small red spot. Noting need for ongoing clopidogrel, placed on clip over ulcer with good results. No evidence of bleeding. Jejunum:normal small intestine mucosa in both efferent and afferent limb    Interventions:  clip           Specimens Removed:  * No specimens in log *    Complications: None. EBL:  Minimal    Impression:    See Postoperative diagnosis above    Recommendations:   - Resume normal medications. - Avoid all NSAIDs. - Prior serum H pylori testing negative. - Start pantoprazole 40 mg twice daily for 12 weeks. Repeat upper endoscopy in 12 weeks to confirm ulcer healing. Will likely recommend decreasing PPI to 20 mg once daily at this time.      Discharge disposition:  Home in the company of  when able to ambulate    Helga Cardona Liz Peters MD

## 2019-05-02 NOTE — H&P
[facility] 
217 [facility] [address] [place], [address] 
[phone] History and Physical  
 
NAME: Chema Tinoco :  1970 MRN:  [id number] HPI:  The patient was seen and examined. Past Surgical History:  
Procedure Laterality Date  HX CORONARY STENT PLACEMENT    
 one stent  HX HEENT    
 surgery for scattered zygomatic bone Past Medical History:  
Diagnosis Date  CAD (coronary artery disease)  Coagulation disorder ([address]   
 on plavix  Gastrointestinal disorder \"ulcer\"  H/O small bowel obstruction  Ill-defined condition NG tube 3 timesf or blockages  Ill-defined condition   
 pre hypertension  Ill-defined condition   
 upper GI bleed  Pyloric stenosis Social History Tobacco Use  Smoking status: Never Smoker  Smokeless tobacco: Never Used Substance Use Topics  Alcohol use: No  
  Comment: social/none per pt  Drug use: No  
 
Allergies Allergen Reactions  Morphine Rash Family History Problem Relation Age of Onset  Cancer Mother   
     breast cancer survivor  Diabetes Mother  Diabetes Father  Diabetes Sister  Diabetes Brother No current facility-administered medications for this encounter. PHYSICAL EXAM: 
General: WD, WN. Alert, cooperative, no acute distress   
HEENT: NC, Atraumatic. PERRLA, EOMI. Anicteric sclerae. Lungs:  CTA Bilaterally. No Wheezing/Rhonchi/Rales. Heart:  Regular  rhythm,  No murmur, No Rubs, No Gallops Abdomen: Soft, Non distended, Non tender.  +Bowel sounds, no HSM Extremities: No c/c/e Neurologic:  CN 2-12 gi, Alert and oriented X 3. No acute neurological distress Psych:   Good insight. Not anxious nor agitated. The heart, lungs and mental status were satisfactory for the administration of MAC sedation and for the procedure. Mallampati score: 2 Assessment: · Melena Plan: · Endoscopic procedure :egd · MAC sedation

## 2019-05-02 NOTE — PROGRESS NOTES
Sean Skiff 
1970 
[id number] Situation: 
[facility]: Procedure(s): ESOPHAGOGASTRODUODENOSCOPY (EGD) RESOLUTION CLIP Background: 
 
Preoperative diagnosis: GI Bleed Postoperative diagnosis: Gastric Ulcer :  Dr. Miracle Serrano Assistant(s): Endoscopy RN-1: Stevo Levi RN Endoscopy RN-2: Zelalem Harmon RN Specimens: * No specimens in log * H. Pylori  no Assessment: 
Intra-procedure medications Anesthesia gave intra-procedure sedation and medications, see anesthesia flow sheet yes Intravenous fluids: NS@ Michael Darius Vital signs stable Abdominal assessment: round and soft Recommendation: 
Discharge patient per MD order. Family or Friend Permission to share finding with family or friend yes

## 2019-05-02 NOTE — DISCHARGE INSTRUCTIONS
[facility]  [address]                                  Alix Severin  [id number]  1970    It was my pleasure seeing you for your procedure. You will also receive a summary report with the findings from this procedure and any further recommendations. If you had polyps removed or biopsies taken during your procedure, you will receive a separate letter from me within the next 2 weeks. If you don't receive this letter or if you have any questions, please call my office [phone]. Please take note of the post procedure instructions listed below. Dr. Lexis Mims Justen    These instructions give you information on caring for yourself after your procedure. Call your doctor if you have any problems or questions after your procedure. HOME CARE  · Walk if you have belly cramping or gas. Walking will help get rid of the air and reduce the bloated feeling in your belly (abdomen). · Your IV site (where you received drugs) may be tender to touch. Place warm towels on the site; keep your arm up on two pillows if you have any swelling or soreness in the area. · You may shower. ACTIVITY:  · Take frequent rest periods and move at a slower pace for the next 24 hours. .  · You may resume your regular activity tomorrow if you are feeling back to normal.  · Do not drive or ride a bicycle for at least 24 hours (because of the medicine (anesthesia) used during the test). · Do not sign any important legal documents or use or operate any machinery for 24 hours  · Do not take sleeping medicines/nerve drugs for 24 hours unless the doctor tells you. · You can return to work/school tomorrow unless otherwise instructed. NUTRITION:  · Drink plenty of fluids to keep your pee (urine) clear or pale yellow  · Begin with a light meal and progress to your normal diet.  Heavy or fried foods are harder to digest and may make you feel sick to your stomach (nauseated). · Once you are feeling back to normal, you may resume your normal diet as instructed by your doctor. · Avoid alcoholic beverages for 24 hours or as instructed. IF YOU HAD BIOPSIES TAKEN OR POLYPS REMOVED DURING THE PROCEDURE:  · For the next 7 days, avoid all non-steroidal antiinflammatory medications such as Ibuprofen, Motrin, Advil, Alleve, Cydney-seltzer, Goody's powder, BC powder. · If you do not have an heart condition that requires you to take a daily aspirin, you should avoid taking aspirin for 7 days. · Eat a soft diet for 24 hours. · Monitor your stools for any blood or dark black, tar-like, stools as this may be a sign of bleeding and if you see any blood, notify your doctor immediately. GET HELP RIGHT AWAY AND SEEK IMMEDIATE MEDICAL CARE IF:  · You have more than a spotting of blood in your stool. · You pass clumps of tissue (blood clots) or fill the toilet with blood. · Your belly is painfully swollen or puffy (abdominal distention). · You throw up (vomit). · You have a fever. · You have redness, pain or swelling at the IV site that last greater than two days. · You have abdominal pain or discomfort that is severe or gets worse throughout the day. Post-procedure diagnosis:  Gastric Ulcer      Findings:  Esophagus:normal mucosa, irregular Z-line 37 cm from the incisors  Stomach:Evidence of prior surgical intervention distal stomach with probable gastro-jejunostomy, 8 mm ulcer located at anastomosis, mostly clean based though with small red spot. Noting need for ongoing clopidogrel, placed on clip over ulcer with good results. No evidence of bleeding. Jejunum:normal small intestine mucosa in both efferent and afferent limb     Recommendations:   - Resume normal medications. - Avoid all NSAIDs. - Prior serum H pylori testing negative. - Start pantoprazole 40 mg twice daily for 12 weeks.  Repeat upper endoscopy in 12 weeks to confirm ulcer healing. Will discuss if we should recommend decreasing PPI to 20 mg once daily at this time.      MyChart Activation    Thank you for requesting access to [facility]. Please follow the instructions below to securely access and download your online medical record. [facility] allows you to send messages to your doctor, view your test results, renew your prescriptions, schedule appointments, and more. How Do I Sign Up? 1. In your internet browser, go to [email or web address]  2. Click on the First Time User? Click Here link in the Sign In box. You will be redirect to the New Member Sign Up page. 3. Enter your [facility] Access Code exactly as it appears below. You will not need to use this code after youve completed the sign-up process. If you do not sign up before the expiration date, you must request a new code. [facility] Access Code: [email or web address]  Expires: 2019 12:15 PM (This is the date your [facility] access code will )    4. Enter the last four digits of your Social Security Number (xxxx) and Date of Birth (mm/dd/yyyy) as indicated and click Submit. You will be taken to the next sign-up page. 5. Create a [facility] ID. This will be your [facility] login ID and cannot be changed, so think of one that is secure and easy to remember. 6. Create a [facility] password. You can change your password at any time. 7. Enter your Password Reset Question and Answer. This can be used at a later time if you forget your password. 8. Enter your e-mail address. You will receive e-mail notification when new information is available in [address]. 9. Click Sign Up. You can now view and download portions of your medical record. 10. Click the Download Summary menu link to download a portable copy of your medical information. Additional Information    If you have questions, please visit the Frequently Asked Questions section of the [facility] website at [email or web address] Remember, [facility] is NOT to be used for urgent needs. For medical emergencies, dial 911.

## 2019-05-02 NOTE — ANESTHESIA POSTPROCEDURE EVALUATION
Post-Anesthesia Evaluation and Assessment Patient: Ankit Saucedo MRN: [id number]  SSN: [id number] YOB: 1970  Age: 51 y.o. Sex: male I have evaluated the patient and they are stable and ready for discharge from the PACU. Cardiovascular Function/Vital Signs Visit Vitals /81 Pulse (!) 0 Temp 37 °C (98.6 °F) Resp 17 Ht 5' 7\" (1.702 m) Wt 81.6 kg (180 lb) SpO2 100% BMI 28.19 kg/m² Patient is status post MAC anesthesia for Procedure(s): ESOPHAGOGASTRODUODENOSCOPY (EGD) RESOLUTION CLIP. Nausea/Vomiting: None Postoperative hydration reviewed and adequate. Pain: 
Pain Scale 1: Numeric (0 - 10) (05/02/19 1200) Pain Intensity 1: 0 (05/02/19 1200) Managed Neurological Status: At baseline Mental Status, Level of Consciousness: Alert and  oriented to person, place, and time Pulmonary Status:  
O2 Device: Room air (05/02/19 1200) Adequate oxygenation and airway patent Complications related to anesthesia: None Post-anesthesia assessment completed. No concerns Signed By: Chris Martinez MD   
 May 2, 2019 Procedure(s): ESOPHAGOGASTRODUODENOSCOPY (EGD) RESOLUTION CLIP. MAC 
 
<BSHSIANPOST> Vitals Value Taken Time  
BP 0/0 5/2/2019 12:29 PM  
Temp 37 °C (98.6 °F) 5/2/2019 12:00 PM  
Pulse 0 5/2/2019 12:29 PM  
Resp 0 5/2/2019 12:31 PM  
SpO2 0 % 5/2/2019 12:24 PM  
Vitals shown include unvalidated device data.

## 2019-05-02 NOTE — PROGRESS NOTES
Received report from anesthesia staff on vital signs and status of patient.  
 
Scope precleaned at bedside by Galilea Booth RN

## 2022-11-10 ENCOUNTER — OFFICE (OUTPATIENT)
Dept: URBAN - METROPOLITAN AREA CLINIC 11 | Facility: CLINIC | Age: 52
End: 2022-11-10

## 2022-11-10 VITALS
SYSTOLIC BLOOD PRESSURE: 136 MMHG | DIASTOLIC BLOOD PRESSURE: 99 MMHG | HEIGHT: 67 IN | HEART RATE: 85 BPM | WEIGHT: 199 LBS | OXYGEN SATURATION: 98 %

## 2022-11-10 DIAGNOSIS — D50.9 IRON DEFICIENCY ANEMIA, UNSPECIFIED: ICD-10-CM

## 2022-11-10 PROCEDURE — 99203 OFFICE O/P NEW LOW 30 MIN: CPT | Performed by: INTERNAL MEDICINE

## 2022-11-10 RX ORDER — SODIUM PICOSULFATE, MAGNESIUM OXIDE, AND ANHYDROUS CITRIC ACID 10; 3.5; 12 MG/160ML; G/160ML; G/160ML
LIQUID ORAL
Qty: 320 | Refills: 0 | Status: COMPLETED
Start: 2022-11-10 | End: 2022-11-21

## 2022-11-21 ENCOUNTER — OFFICE (OUTPATIENT)
Dept: URBAN - METROPOLITAN AREA PATHOLOGY 22 | Facility: PATHOLOGY | Age: 52
End: 2022-11-21

## 2022-11-21 ENCOUNTER — AMBULATORY SURGICAL CENTER (OUTPATIENT)
Dept: URBAN - METROPOLITAN AREA SURGERY 3 | Facility: SURGERY | Age: 52
End: 2022-11-21

## 2022-11-21 ENCOUNTER — AMBULATORY SURGICAL CENTER (OUTPATIENT)
Dept: URBAN - METROPOLITAN AREA SURGERY 3 | Facility: SURGERY | Age: 52
End: 2022-11-21
Payer: COMMERCIAL

## 2022-11-21 VITALS
HEART RATE: 72 BPM | OXYGEN SATURATION: 99 % | SYSTOLIC BLOOD PRESSURE: 137 MMHG | DIASTOLIC BLOOD PRESSURE: 71 MMHG | SYSTOLIC BLOOD PRESSURE: 139 MMHG | HEART RATE: 81 BPM | TEMPERATURE: 97.7 F | DIASTOLIC BLOOD PRESSURE: 90 MMHG | OXYGEN SATURATION: 98 % | DIASTOLIC BLOOD PRESSURE: 86 MMHG | HEART RATE: 69 BPM | TEMPERATURE: 97.7 F | OXYGEN SATURATION: 99 % | RESPIRATION RATE: 18 BRPM | RESPIRATION RATE: 24 BRPM | HEIGHT: 67 IN | OXYGEN SATURATION: 100 % | DIASTOLIC BLOOD PRESSURE: 90 MMHG | HEART RATE: 81 BPM | RESPIRATION RATE: 18 BRPM | DIASTOLIC BLOOD PRESSURE: 95 MMHG | RESPIRATION RATE: 20 BRPM | RESPIRATION RATE: 21 BRPM | SYSTOLIC BLOOD PRESSURE: 127 MMHG | OXYGEN SATURATION: 98 % | SYSTOLIC BLOOD PRESSURE: 137 MMHG | WEIGHT: 188 LBS | RESPIRATION RATE: 18 BRPM | DIASTOLIC BLOOD PRESSURE: 71 MMHG | SYSTOLIC BLOOD PRESSURE: 139 MMHG | HEART RATE: 72 BPM | SYSTOLIC BLOOD PRESSURE: 129 MMHG | SYSTOLIC BLOOD PRESSURE: 130 MMHG | TEMPERATURE: 97.7 F | RESPIRATION RATE: 24 BRPM | DIASTOLIC BLOOD PRESSURE: 80 MMHG | RESPIRATION RATE: 23 BRPM | DIASTOLIC BLOOD PRESSURE: 90 MMHG | DIASTOLIC BLOOD PRESSURE: 80 MMHG | RESPIRATION RATE: 20 BRPM | DIASTOLIC BLOOD PRESSURE: 80 MMHG | HEART RATE: 74 BPM | HEART RATE: 69 BPM | OXYGEN SATURATION: 100 % | HEART RATE: 81 BPM | DIASTOLIC BLOOD PRESSURE: 95 MMHG | DIASTOLIC BLOOD PRESSURE: 71 MMHG | SYSTOLIC BLOOD PRESSURE: 139 MMHG | WEIGHT: 188 LBS | SYSTOLIC BLOOD PRESSURE: 129 MMHG | HEART RATE: 69 BPM | RESPIRATION RATE: 23 BRPM | SYSTOLIC BLOOD PRESSURE: 129 MMHG | OXYGEN SATURATION: 100 % | SYSTOLIC BLOOD PRESSURE: 127 MMHG | DIASTOLIC BLOOD PRESSURE: 86 MMHG | RESPIRATION RATE: 20 BRPM | SYSTOLIC BLOOD PRESSURE: 130 MMHG | HEART RATE: 72 BPM | SYSTOLIC BLOOD PRESSURE: 130 MMHG | RESPIRATION RATE: 21 BRPM | HEIGHT: 67 IN | RESPIRATION RATE: 21 BRPM | DIASTOLIC BLOOD PRESSURE: 95 MMHG | OXYGEN SATURATION: 99 % | SYSTOLIC BLOOD PRESSURE: 137 MMHG | RESPIRATION RATE: 23 BRPM | WEIGHT: 188 LBS | RESPIRATION RATE: 24 BRPM | HEART RATE: 74 BPM | OXYGEN SATURATION: 98 % | HEIGHT: 67 IN | SYSTOLIC BLOOD PRESSURE: 127 MMHG | DIASTOLIC BLOOD PRESSURE: 86 MMHG | HEART RATE: 74 BPM

## 2022-11-21 DIAGNOSIS — K21.00 GASTRO-ESOPHAGEAL REFLUX DISEASE WITH ESOPHAGITIS, WITHOUT B: ICD-10-CM

## 2022-11-21 DIAGNOSIS — Z48.815 ENCOUNTER FOR SURGICAL AFTERCARE FOLLOWING SURGERY ON THE DI: ICD-10-CM

## 2022-11-21 DIAGNOSIS — D50.9 IRON DEFICIENCY ANEMIA, UNSPECIFIED: ICD-10-CM

## 2022-11-21 PROCEDURE — 43239 EGD BIOPSY SINGLE/MULTIPLE: CPT | Mod: 51 | Performed by: INTERNAL MEDICINE

## 2022-11-21 PROCEDURE — 45378 DIAGNOSTIC COLONOSCOPY: CPT | Performed by: INTERNAL MEDICINE

## 2022-11-21 PROCEDURE — 43239 EGD BIOPSY SINGLE/MULTIPLE: CPT | Performed by: INTERNAL MEDICINE

## 2022-11-21 NOTE — SERVICEHPINOTES
Mr. Spears is an extremely pleasant 52-year-old male who recently moved here 4 months ago from [facility]. He has a history of hypertension, hyperlipidemia, coronary disease status post stents in 2018. He had an upper GI bleed in late 2018 in 2019 and describes having 2 upper endoscopies. He has had some mild iron deficiency that since that time. He was recently started on a on iron pills and feels much better. Denies any significant abdominal pain. He did have multiple surgeries as an infant for pyloric stenosis. He does not take any NSAIDs. He moves his bowels regularly, no hematochezia. We discussed that he needs EGD and colonoscopy. He takes aspirin and clopidogrel. He has excellent functional capacity.

## 2022-11-21 NOTE — SERVICEHPINOTES
Mr. Spears is an extremely pleasant 52-year-old male who recently moved here 4 months ago from [facility]. He has a history of hypertension, hyperlipidemia, coronary disease status post stents in 2018. He had an upper GI bleed in late 2018 in 2019 and describes having 2 upper endoscopies. He has had some mild iron deficiency that since that time. He was recently started on a on iron pills and feels much better. Denies any significant abdominal pain. He did have multiple surgeries as an infant for pyloric stenosis. He does not take any NSAIDs. He moves his bowels regularly, no hematochezia. We discussed that he needs EGD and colonoscopy. He takes aspirin and clopidogrel. He has excellent functional capacity.

## 2022-11-21 NOTE — SERVICEHPINOTES
Mr. Spears is an extremely pleasant 52-year-old male who recently moved here 4 months ago from [facility]. He has a history of hypertension, hyperlipidemia, coronary disease status post stents in 2018. He had an upper GI bleed in late 2018 in 2019 and describes having 2 upper endoscopies. He has had some mild iron deficiency that since that time. He was recently started on a on iron pills and feels much better. Denies any significant abdominal pain. He did have multiple surgeries as an infant for pyloric stenosis. He does not take any NSAIDs. He moves his bowels regularly, no hematochezia. We discussed that he needs EGD and colonoscopy. He takes aspirin and clopidogrel. He has excellent functional capacity.

## 2022-11-22 LAB
CBC, PLATELET, NO DIFFERENTIAL: HEMATOCRIT: 44.9 % (ref 37.5–51)
CBC, PLATELET, NO DIFFERENTIAL: HEMOGLOBIN: 14.3 G/DL (ref 13–17.7)
CBC, PLATELET, NO DIFFERENTIAL: MCH: 26.1 PG — LOW (ref 26.6–33)
CBC, PLATELET, NO DIFFERENTIAL: MCHC: 31.8 G/DL (ref 31.5–35.7)
CBC, PLATELET, NO DIFFERENTIAL: MCV: 82 FL (ref 79–97)
CBC, PLATELET, NO DIFFERENTIAL: NRBC: (no result)
CBC, PLATELET, NO DIFFERENTIAL: PLATELETS: 322 X10E3/UL (ref 150–450)
CBC, PLATELET, NO DIFFERENTIAL: RBC: 5.48 X10E6/UL (ref 4.14–5.8)
CBC, PLATELET, NO DIFFERENTIAL: RDW: 18.2 % — HIGH (ref 11.6–15.4)
CBC, PLATELET, NO DIFFERENTIAL: WBC: 4.7 X10E3/UL (ref 3.4–10.8)
FE+TIBC+FER: FERRITIN: 24 NG/ML — LOW (ref 30–400)
FE+TIBC+FER: IRON BIND.CAP.(TIBC): 424 UG/DL (ref 250–450)
FE+TIBC+FER: IRON SATURATION: 18 % (ref 15–55)
FE+TIBC+FER: IRON: 78 UG/DL (ref 38–169)
FE+TIBC+FER: UIBC: 346 UG/DL — HIGH (ref 111–343)

## 2023-12-28 NOTE — PROGRESS NOTES
Bedside shift change report given to Sana (oncoming nurse) by Kandis Archer (offgoing nurse). Report included the following information SBAR. show

## (undated) DEVICE — 1200 GUARD II KIT W/5MM TUBE W/O VAC TUBE: Brand: GUARDIAN

## (undated) DEVICE — SET ADMIN 16ML TBNG L100IN 2 Y INJ SITE IV PIGGY BK DISP

## (undated) DEVICE — CANN NASAL O2 CAPNOGRAPHY AD -- FILTERLINE

## (undated) DEVICE — BAG BELONG PT PERS CLEAR HANDL

## (undated) DEVICE — NEONATAL-ADULT SPO2 SENSOR: Brand: NELLCOR

## (undated) DEVICE — NEEDLE HYPO 18GA L1.5IN PNK S STL HUB POLYPR SHLD REG BVL

## (undated) DEVICE — Device

## (undated) DEVICE — ENDO CARRY-ON PROCEDURE KIT INCLUDES ENZYMATIC SPONGE, GAUZE, BIOHAZARD LABEL, TRAY, LUBRICANT, DIRTY SCOPE LABEL, WATER LABEL, TRAY, DRAWSTRING PAD, AND DEFENDO 4-PIECE KIT.: Brand: ENDO CARRY-ON PROCEDURE KIT

## (undated) DEVICE — BW-412T DISP COMBO CLEANING BRUSH: Brand: SINGLE USE COMBINATION CLEANING BRUSH

## (undated) DEVICE — SYRINGE MED 20ML STD CLR PLAS LUERLOCK TIP N CTRL DISP

## (undated) DEVICE — Z DISCONTINUED NO SUB IDED SET EXTN W/ 4 W STPCOCK M SPIN LOK 36IN

## (undated) DEVICE — Device: Brand: MEDICAL ACTION INDUSTRIES

## (undated) DEVICE — SYR 50ML SLIP TIP NSAF LF STRL --

## (undated) DEVICE — CATH IV AUTOGRD BC BLU 22GA 25 -- INSYTE

## (undated) DEVICE — BITE BLK ENDOSCP AD 54FR GRN POLYETH ENDOSCP W STRP SLD

## (undated) DEVICE — SOLIDIFIER FLUID 3000 CC ABSORB

## (undated) DEVICE — KENDALL RADIOLUCENT FOAM MONITORING ELECTRODE -RECTANGULAR SHAPE: Brand: KENDALL

## (undated) DEVICE — KIT IV STRT W CHLORAPREP PD 1ML

## (undated) DEVICE — CLIP MED L235CM L2.8MM 11MM OPN HEMSTAT FIX RESOL